# Patient Record
Sex: FEMALE | Race: WHITE | Employment: OTHER | URBAN - METROPOLITAN AREA
[De-identification: names, ages, dates, MRNs, and addresses within clinical notes are randomized per-mention and may not be internally consistent; named-entity substitution may affect disease eponyms.]

---

## 2017-01-14 ENCOUNTER — HOSPITAL ENCOUNTER (EMERGENCY)
Facility: HOSPITAL | Age: 47
Discharge: HOME | End: 2017-01-14
Payer: MEDICARE

## 2017-01-14 ENCOUNTER — APPOINTMENT (OUTPATIENT)
Dept: GENERAL RADIOLOGY | Facility: HOSPITAL | Age: 47
End: 2017-01-14
Payer: MEDICARE

## 2017-01-14 VITALS
HEIGHT: 63 IN | WEIGHT: 274 LBS | TEMPERATURE: 87 F | BODY MASS INDEX: 48.55 KG/M2 | OXYGEN SATURATION: 98 % | HEART RATE: 79 BPM | SYSTOLIC BLOOD PRESSURE: 129 MMHG | DIASTOLIC BLOOD PRESSURE: 70 MMHG | RESPIRATION RATE: 18 BRPM

## 2017-01-14 DIAGNOSIS — S62.645A CLOSED NONDISPLACED FRACTURE OF PROXIMAL PHALANX OF LEFT RING FINGER, INITIAL ENCOUNTER: Primary | ICD-10-CM

## 2017-01-14 PROCEDURE — 73130 X-RAY EXAM OF HAND: CPT

## 2017-01-14 PROCEDURE — 99283 EMERGENCY DEPT VISIT LOW MDM: CPT

## 2017-01-14 RX ORDER — TRAMADOL HYDROCHLORIDE 50 MG/1
50 TABLET ORAL EVERY 8 HOURS PRN
Qty: 15 TABLET | Refills: 0 | Status: SHIPPED | OUTPATIENT
Start: 2017-01-14 | End: 2017-01-19

## 2017-01-14 RX ORDER — TRAMADOL HYDROCHLORIDE 50 MG/1
50 TABLET ORAL ONCE
Status: COMPLETED | OUTPATIENT
Start: 2017-01-14 | End: 2017-01-14

## 2017-01-15 NOTE — ED PROVIDER NOTES
Patient Seen in: [facility] AND [facility] Emergency Department    History   Patient presents with:  Musculoskeletal Problem    Stated Complaint: Lt hand injury     HPI    43 yo F with PMH HTN, CREST syndrome on chronic prednisone presenting for evaluation of left MCG Oral Tab,  Take 1,000 mcg by mouth daily. azathioprine 100 MG Oral Tab,  Take 150 mg by mouth daily.    Albuterol Sulfate HFA (PROAIR HFA) 108 (90 BASE) MCG/ACT Inhalation Aero Soln,  Inhale 2 puffs into the lungs every 4 (four) hours as needed for [employer] Device 01/14/17 1902 None (Room air)       Current:/78 mmHg  Pulse 89  Temp(Src) 98 °F (36.7 °C) (Oral)  Resp 18  Ht 160 cm (5' 3\")  Wt 124.286 kg  BMI 48.55 kg/m2  SpO2 98%  LMP 11/08/2011        Physical Exam   Constitutional: No distress. Obese. history and physical exam differential diagnosis was considered for fracture, dislocation, sprain. Pulse ox: 98%:Normal on RA, as interpreted by myself    Evaluation for left fourth finger pain after [address] to left hand.  No proximal extremity tenderness,

## 2017-01-20 PROBLEM — S62.651A CLOSED NONDISPLACED FRACTURE OF MIDDLE PHALANX OF LEFT INDEX FINGER, INITIAL ENCOUNTER: Status: ACTIVE | Noted: 2017-01-20

## 2017-01-26 PROBLEM — S62.651D CLOSED NONDISPLACED FRACTURE OF MIDDLE PHALANX OF LEFT INDEX FINGER WITH ROUTINE HEALING, SUBSEQUENT ENCOUNTER: Status: ACTIVE | Noted: 2017-01-26

## 2017-01-27 ENCOUNTER — HOSPITAL ENCOUNTER (OUTPATIENT)
Facility: HOSPITAL | Age: 47
Setting detail: OBSERVATION
Discharge: HOME | End: 2017-01-28
Attending: EMERGENCY MEDICINE | Admitting: INTERNAL MEDICINE
Payer: MEDICARE

## 2017-01-27 DIAGNOSIS — R07.9 ACUTE CHEST PAIN: Primary | ICD-10-CM

## 2017-01-27 LAB
ANION GAP SERPL CALC-SCNC: 11 MMOL/L (ref 0–18)
BASOPHILS # BLD: 0.1 K/UL (ref 0–0.2)
BASOPHILS NFR BLD: 1 %
BUN SERPL-MCNC: 13 MG/DL (ref 8–20)
BUN/CREAT SERPL: 16 (ref 10–20)
CALCIUM SERPL-MCNC: 9.6 MG/DL (ref 8.5–10.5)
CHLORIDE SERPL-SCNC: 105 MMOL/L (ref 95–110)
CO2 SERPL-SCNC: 25 MMOL/L (ref 22–32)
CREAT SERPL-MCNC: 0.81 MG/DL (ref 0.5–1.5)
D DIMER PPP FEU-MCNC: 0.47 MCG/ML
EOSINOPHIL # BLD: 0.1 K/UL (ref 0–0.7)
EOSINOPHIL NFR BLD: 1 %
ERYTHROCYTE [DISTWIDTH] IN BLOOD BY AUTOMATED COUNT: 15.5 % (ref 11–15)
GLUCOSE SERPL-MCNC: 99 MG/DL (ref 70–99)
HCT VFR BLD AUTO: 41.2 % (ref 35–48)
HGB BLD-MCNC: 13.8 G/DL (ref 12–16)
LYMPHOCYTES # BLD: 1.4 K/UL (ref 1–4)
LYMPHOCYTES NFR BLD: 11 %
MCH RBC QN AUTO: 27.3 PG (ref 27–32)
MCHC RBC AUTO-ENTMCNC: 33.6 G/DL (ref 32–37)
MCV RBC AUTO: 81 FL (ref 80–100)
MONOCYTES # BLD: 0.6 K/UL (ref 0–1)
MONOCYTES NFR BLD: 5 %
NEUTROPHILS # BLD AUTO: 10.3 K/UL (ref 1.8–7.7)
NEUTROPHILS NFR BLD: 82 %
OSMOLALITY UR CALC.SUM OF ELEC: 292 MOSM/KG (ref 275–295)
PLATELET # BLD AUTO: 396 K/UL (ref 140–400)
PMV BLD AUTO: 6.8 FL (ref 7.4–10.3)
POTASSIUM SERPL-SCNC: 3.8 MMOL/L (ref 3.3–5.1)
RBC # BLD AUTO: 5.08 M/UL (ref 3.7–5.4)
SODIUM SERPL-SCNC: 141 MMOL/L (ref 136–144)
TROPONIN I SERPL-MCNC: 0 NG/ML (ref ?–0.03)
TROPONIN I SERPL-MCNC: 0 NG/ML (ref ?–0.03)
WBC # BLD AUTO: 12.5 K/UL (ref 4–11)

## 2017-01-27 PROCEDURE — 99285 EMERGENCY DEPT VISIT HI MDM: CPT

## 2017-01-27 PROCEDURE — 84484 ASSAY OF TROPONIN QUANT: CPT | Performed by: EMERGENCY MEDICINE

## 2017-01-27 PROCEDURE — 36415 COLL VENOUS BLD VENIPUNCTURE: CPT

## 2017-01-27 PROCEDURE — 80048 BASIC METABOLIC PNL TOTAL CA: CPT | Performed by: EMERGENCY MEDICINE

## 2017-01-27 PROCEDURE — 93010 ELECTROCARDIOGRAM REPORT: CPT | Performed by: EMERGENCY MEDICINE

## 2017-01-27 PROCEDURE — 93005 ELECTROCARDIOGRAM TRACING: CPT

## 2017-01-27 PROCEDURE — 85025 COMPLETE CBC W/AUTO DIFF WBC: CPT | Performed by: EMERGENCY MEDICINE

## 2017-01-27 PROCEDURE — 85379 FIBRIN DEGRADATION QUANT: CPT | Performed by: EMERGENCY MEDICINE

## 2017-01-27 RX ORDER — DOXEPIN HYDROCHLORIDE 50 MG/1
1 CAPSULE ORAL DAILY
Status: DISCONTINUED | OUTPATIENT
Start: 2017-01-27 | End: 2017-01-28

## 2017-01-27 RX ORDER — PREDNISONE 20 MG/1
20 TABLET ORAL DAILY
Status: DISCONTINUED | OUTPATIENT
Start: 2017-01-27 | End: 2017-01-28

## 2017-01-27 RX ORDER — MAGNESIUM OXIDE 400 MG (241.3 MG MAGNESIUM) TABLET
400 TABLET DAILY
Status: DISCONTINUED | OUTPATIENT
Start: 2017-01-27 | End: 2017-01-28

## 2017-01-27 RX ORDER — ALBUTEROL SULFATE 90 UG/1
2 AEROSOL, METERED RESPIRATORY (INHALATION) EVERY 4 HOURS PRN
Status: DISCONTINUED | OUTPATIENT
Start: 2017-01-27 | End: 2017-01-28

## 2017-01-27 RX ORDER — AZATHIOPRINE 50 MG/1
150 TABLET ORAL DAILY
Status: DISCONTINUED | OUTPATIENT
Start: 2017-01-27 | End: 2017-01-28

## 2017-01-27 RX ORDER — FLUTICASONE PROPIONATE 50 MCG
2 SPRAY, SUSPENSION (ML) NASAL 2 TIMES DAILY
Status: DISCONTINUED | OUTPATIENT
Start: 2017-01-27 | End: 2017-01-28

## 2017-01-27 RX ORDER — DULOXETIN HYDROCHLORIDE 30 MG/1
60 CAPSULE, DELAYED RELEASE ORAL DAILY
Status: DISCONTINUED | OUTPATIENT
Start: 2017-01-27 | End: 2017-01-28

## 2017-01-27 RX ORDER — TRAZODONE HYDROCHLORIDE 100 MG/1
100 TABLET ORAL NIGHTLY
Status: DISCONTINUED | OUTPATIENT
Start: 2017-01-27 | End: 2017-01-28

## 2017-01-27 RX ORDER — ALBUTEROL SULFATE 0.75 MG/3ML
0.63 SOLUTION RESPIRATORY (INHALATION) EVERY 4 HOURS PRN
Status: DISCONTINUED | OUTPATIENT
Start: 2017-01-27 | End: 2017-01-28

## 2017-01-27 RX ORDER — DOXEPIN HYDROCHLORIDE 50 MG/1
1 CAPSULE ORAL DAILY
COMMUNITY

## 2017-01-27 RX ORDER — MAGNESIUM OXIDE 400 MG (241.3 MG MAGNESIUM) TABLET
400 TABLET DAILY
COMMUNITY
End: 2017-02-03 | Stop reason: ALTCHOICE

## 2017-01-27 RX ORDER — ENOXAPARIN SODIUM 100 MG/ML
40 INJECTION SUBCUTANEOUS DAILY
Status: DISCONTINUED | OUTPATIENT
Start: 2017-01-28 | End: 2017-01-28

## 2017-01-27 RX ORDER — PANTOPRAZOLE SODIUM 40 MG/1
40 TABLET, DELAYED RELEASE ORAL
Status: DISCONTINUED | OUTPATIENT
Start: 2017-01-28 | End: 2017-01-28

## 2017-01-28 ENCOUNTER — APPOINTMENT (OUTPATIENT)
Dept: NUCLEAR MEDICINE | Facility: HOSPITAL | Age: 47
End: 2017-01-28
Attending: INTERNAL MEDICINE
Payer: MEDICARE

## 2017-01-28 ENCOUNTER — APPOINTMENT (OUTPATIENT)
Dept: CV DIAGNOSTICS | Facility: HOSPITAL | Age: 47
End: 2017-01-28
Attending: INTERNAL MEDICINE
Payer: MEDICARE

## 2017-01-28 VITALS
OXYGEN SATURATION: 96 % | SYSTOLIC BLOOD PRESSURE: 133 MMHG | HEART RATE: 81 BPM | BODY MASS INDEX: 49.02 KG/M2 | RESPIRATION RATE: 16 BRPM | DIASTOLIC BLOOD PRESSURE: 67 MMHG | TEMPERATURE: 98 F | WEIGHT: 276.63 LBS | HEIGHT: 63 IN

## 2017-01-28 LAB
ANION GAP SERPL CALC-SCNC: 8 MMOL/L (ref 0–18)
BASOPHILS # BLD: 0.1 K/UL (ref 0–0.2)
BASOPHILS NFR BLD: 1 %
BUN SERPL-MCNC: 14 MG/DL (ref 8–20)
BUN/CREAT SERPL: 15.1 (ref 10–20)
CALCIUM SERPL-MCNC: 9 MG/DL (ref 8.5–10.5)
CHLORIDE SERPL-SCNC: 106 MMOL/L (ref 95–110)
CO2 SERPL-SCNC: 27 MMOL/L (ref 22–32)
CREAT SERPL-MCNC: 0.93 MG/DL (ref 0.5–1.5)
EOSINOPHIL # BLD: 0.4 K/UL (ref 0–0.7)
EOSINOPHIL NFR BLD: 4 %
ERYTHROCYTE [DISTWIDTH] IN BLOOD BY AUTOMATED COUNT: 15.5 % (ref 11–15)
GLUCOSE SERPL-MCNC: 112 MG/DL (ref 70–99)
HCT VFR BLD AUTO: 38.1 % (ref 35–48)
HGB BLD-MCNC: 12.7 G/DL (ref 12–16)
LYMPHOCYTES # BLD: 2.1 K/UL (ref 1–4)
LYMPHOCYTES NFR BLD: 22 %
MAGNESIUM SERPL-MCNC: 1.9 MG/DL (ref 1.8–2.5)
MCH RBC QN AUTO: 27.1 PG (ref 27–32)
MCHC RBC AUTO-ENTMCNC: 33.3 G/DL (ref 32–37)
MCV RBC AUTO: 81.4 FL (ref 80–100)
MONOCYTES # BLD: 0.9 K/UL (ref 0–1)
MONOCYTES NFR BLD: 9 %
NEUTROPHILS # BLD AUTO: 6.4 K/UL (ref 1.8–7.7)
NEUTROPHILS NFR BLD: 65 %
OSMOLALITY UR CALC.SUM OF ELEC: 293 MOSM/KG (ref 275–295)
PLATELET # BLD AUTO: 341 K/UL (ref 140–400)
PMV BLD AUTO: 6.6 FL (ref 7.4–10.3)
POTASSIUM SERPL-SCNC: 3.6 MMOL/L (ref 3.3–5.1)
RBC # BLD AUTO: 4.68 M/UL (ref 3.7–5.4)
SODIUM SERPL-SCNC: 141 MMOL/L (ref 136–144)
TROPONIN I SERPL-MCNC: 0 NG/ML (ref ?–0.03)
WBC # BLD AUTO: 9.8 K/UL (ref 4–11)

## 2017-01-28 PROCEDURE — 94660 CPAP INITIATION&MGMT: CPT

## 2017-01-28 PROCEDURE — 93017 CV STRESS TEST TRACING ONLY: CPT

## 2017-01-28 PROCEDURE — 93016 CV STRESS TEST SUPVJ ONLY: CPT | Performed by: INTERNAL MEDICINE

## 2017-01-28 PROCEDURE — 84484 ASSAY OF TROPONIN QUANT: CPT | Performed by: HOSPITALIST

## 2017-01-28 PROCEDURE — 78452 HT MUSCLE IMAGE SPECT MULT: CPT

## 2017-01-28 PROCEDURE — 85025 COMPLETE CBC W/AUTO DIFF WBC: CPT | Performed by: INTERNAL MEDICINE

## 2017-01-28 PROCEDURE — 83735 ASSAY OF MAGNESIUM: CPT | Performed by: INTERNAL MEDICINE

## 2017-01-28 PROCEDURE — 80048 BASIC METABOLIC PNL TOTAL CA: CPT | Performed by: INTERNAL MEDICINE

## 2017-01-28 PROCEDURE — 96372 THER/PROPH/DIAG INJ SC/IM: CPT

## 2017-01-28 PROCEDURE — 94640 AIRWAY INHALATION TREATMENT: CPT

## 2017-01-28 PROCEDURE — 93018 CV STRESS TEST I&R ONLY: CPT | Performed by: INTERNAL MEDICINE

## 2017-01-28 RX ORDER — SODIUM CHLORIDE 0.9 % (FLUSH) 0.9 %
SYRINGE (ML) INJECTION
Status: COMPLETED
Start: 2017-01-28 | End: 2017-01-28

## 2017-01-28 NOTE — ED PROVIDER NOTES
Patient Seen in: [facility] AND [facility] Emergency Department    History   Patient presents with:  Chest Pain Angina (cardiovascular)    Stated Complaint: chest pain    HPI    42year old female presenting with chest pain  Location: right chest area.   Quality: a Budesonide-Formoterol Fumarate (SYMBICORT) 160-4.5 MCG/ACT Inhalation Aerosol,  Inhale 2 puffs into the lungs 2 (two) times daily. COD LIVER OIL OR,  Take by mouth. MILK THISTLE OR,  Take by mouth.    Biotin 1000 MCG Oral Tab,  Take 1,000 mcg by mout agreed except as otherwise stated in HPI.     Physical Exam     ED Triage Vitals   BP 01/27/17 1840 149/94 mmHg   Pulse 01/27/17 1840 98   Resp 01/27/17 1840 20   Temp 01/27/17 1840 97.7 °F (36.5 °C)   Temp src 01/27/17 1840 Temporal   SpO2 01/27/17 1840 98 12.5 (*)     RDW 15.5 (*)     MPV 6.8 (*)     Neutrophil Absolute 10.3 (*)     All other components within normal limits   TROPONIN I, 0 HOUR - Normal   BASIC METABOLIC PANEL (8) - Normal   CBC WITH DIFFERENTIAL WITH PLATELET    Narrative:      The followin ST deviation 2pt, Non spec 1pt, nl 0pt)  1   AGE- (>65 2pt, 45-64 1 pt, Under 45 0 pt)   0   Risk Factors- ( DM,HTN,Chol, fhx CAD, BMI>30, hx CAD)  ( >3 or hx CAD 2pt, 1-2 risks 1pt, None 0pt)  1   Troponin- ( 3 times nl 2pt, 2 times nl 1pt, nl 0pt   0 problem list. to contribute to the complexity of this ED evaluation. Radiology Interpretation: Radiology reports and images reviewed personally and are negative.  I discussed these results, their acute management issues, and the need for follow-up, with

## 2017-01-28 NOTE — ED NOTES
Patient to ED for c/o of sudden onset chest pain that began while she was sitting in a car. While walking around at the grocery store the pain intensified in her mid sternal chest area with radiation to the right shoulder and upper back.  She states that pa

## 2017-01-28 NOTE — H&P
[facility] Internal Medicine Discharge Summary   Patient ID:  Didier Lamas  [id number]  55 year old  4/19/1970    Admit date: 1/27/2017    Discharge date and time: 1/28/17    Attending Physician: Piero Sandra MD     Primary Care Physician: Lily Hernandez predniSONE 10 MG Tabs   Commonly known as:  Robert Mini 160-4.5 MCG/ACT Aero   Generic drug:  Budesonide-Formoterol Fumarate       TraZODone HCl 100 MG Tabs   Commonly known as:  DESYREL   Take 1 tablet by mouth nightly.        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

## 2017-01-28 NOTE — H&P
[facility] Hospitalist H&P     CC: Patient presents with:  Chest Pain Angina (cardiovascular)       PCP: Lily Hernandez MD      Assessment and Plan     Didier Lamas is a 42year old female with PMH sig for Churg elizabeth syndrome, eosinophilic gastroenteritis allergic rhinitis who  Presented with sudden onset of moderate chest pressure like pain in the right chest, radiating to to her right arm/shoulder. Lasted about 1 hour and then resolved. Was sitting. Thought it got worse with walking.   Denies N/V/diarph Use: No        Fam Hx  Family History   Problem Relation Age of Onset   • Other Father      \"dark patches on skin\"   • Other Sister      psoriatic arthritis, \"adult-onset hydrocephalus\"   • Other Brother      psoriatic arthritis, vitiligo           Obj tissue swelling. Dictated by (CST): Petey Watts MD on 1/14/2017 at 20:15     Approved by (CST): Petey Watts MD on 1/14/2017 at 20:19          1/14/2017  CONCLUSION:  1. Nondisplaced fracture proximal phalanx left ring finger.

## 2017-01-28 NOTE — ED INITIAL ASSESSMENT (HPI)
Pt c/o sudden onset of chest pain back pain and right shoulder pain since 515pm EKG completed in triage diarrhea and nausea x 3 days

## 2017-01-28 NOTE — ED NOTES
Report given to [facility]. Tele box ordered. Will request transport once in place.

## 2017-01-28 NOTE — PLAN OF CARE
Problem: PAIN - ADULT  Goal: Verbalizes/displays adequate comfort level or patient’s stated pain goal  INTERVENTIONS:  - Encourage pt to monitor pain and request assistance  - Assess pain using appropriate pain scale  - Administer analgesics based on type needed  - Consider post-discharge preferences of patient/family/discharge partner  - Complete POLST form as appropriate  - Assess patient’s ability to be responsible for managing their own health  - Refer to Case Management Department for coordinating disc

## 2017-01-29 NOTE — DISCHARGE SUMMARY
[facility] Internal Medicine Discharge Summary   Patient ID:  Jordan Sherman  [id number]  54 year old  4/19/1970    Admit date: 1/27/2017    Discharge date and time: 1/28/17    Attending Physician: Romelia Erwin MD     Primary Care Physician: Zane Kehr, predniSONE 10 MG Tabs   Commonly known as:  Ardyce Blind 160-4.5 MCG/ACT Aero   Generic drug:  Budesonide-Formoterol Fumarate       TraZODone HCl 100 MG Tabs   Commonly known as:  DESYREL   Take 1 tablet by mouth nightly.        WATER PILL/POTA 42year old female with PMH sig for Churg elizabeth syndrome, eosinophilic gastroenteritis, recurrent pelurisy/pericarditis on chronic steroids, vasculitic neuropathy, anxiety, GERD, depression, HTN,  Asthma, and allergic rhinitis who  Presented with sudden

## 2017-02-03 RX ORDER — METOCLOPRAMIDE 10 MG/1
10 TABLET ORAL ONCE
Status: CANCELLED | OUTPATIENT
Start: 2017-02-03 | End: 2017-02-03

## 2017-02-03 NOTE — OR NURSING
Pt states, \"I gave this information already and don't have time\" when called to review medical history and give instructions for surgery.  I was able to review medications pt is to take the morning of surgery and instruct pt to hold her herbals and supple

## 2017-02-08 ENCOUNTER — ANESTHESIA (OUTPATIENT)
Dept: SURGERY | Facility: HOSPITAL | Age: 47
End: 2017-02-08
Payer: MEDICARE

## 2017-02-08 ENCOUNTER — HOSPITAL ENCOUNTER (OUTPATIENT)
Facility: HOSPITAL | Age: 47
Setting detail: HOSPITAL OUTPATIENT SURGERY
Discharge: HOME | End: 2017-02-08
Attending: ORTHOPAEDIC SURGERY | Admitting: ORTHOPAEDIC SURGERY
Payer: MEDICARE

## 2017-02-08 ENCOUNTER — SURGERY (OUTPATIENT)
Age: 47
End: 2017-02-08

## 2017-02-08 ENCOUNTER — ANESTHESIA EVENT (OUTPATIENT)
Dept: SURGERY | Facility: HOSPITAL | Age: 47
End: 2017-02-08
Payer: MEDICARE

## 2017-02-08 VITALS
HEART RATE: 83 BPM | RESPIRATION RATE: 14 BRPM | SYSTOLIC BLOOD PRESSURE: 124 MMHG | OXYGEN SATURATION: 96 % | HEIGHT: 63 IN | TEMPERATURE: 98 F | WEIGHT: 281 LBS | DIASTOLIC BLOOD PRESSURE: 49 MMHG | BODY MASS INDEX: 49.79 KG/M2

## 2017-02-08 DIAGNOSIS — S62.615A: ICD-10-CM

## 2017-02-08 PROCEDURE — 0PSV34Z REPOSITION LEFT FINGER PHALANX WITH INTERNAL FIXATION DEVICE, PERCUTANEOUS APPROACH: ICD-10-PCS | Performed by: ORTHOPAEDIC SURGERY

## 2017-02-08 RX ORDER — SODIUM CHLORIDE, SODIUM LACTATE, POTASSIUM CHLORIDE, CALCIUM CHLORIDE 600; 310; 30; 20 MG/100ML; MG/100ML; MG/100ML; MG/100ML
INJECTION, SOLUTION INTRAVENOUS CONTINUOUS PRN
Status: DISCONTINUED | OUTPATIENT
Start: 2017-02-08 | End: 2017-02-08 | Stop reason: SURG

## 2017-02-08 RX ORDER — SODIUM CHLORIDE, SODIUM LACTATE, POTASSIUM CHLORIDE, CALCIUM CHLORIDE 600; 310; 30; 20 MG/100ML; MG/100ML; MG/100ML; MG/100ML
INJECTION, SOLUTION INTRAVENOUS CONTINUOUS
Status: DISCONTINUED | OUTPATIENT
Start: 2017-02-08 | End: 2017-02-08

## 2017-02-08 RX ORDER — HALOPERIDOL 5 MG/ML
0.25 INJECTION INTRAMUSCULAR ONCE AS NEEDED
Status: DISCONTINUED | OUTPATIENT
Start: 2017-02-08 | End: 2017-02-08

## 2017-02-08 RX ORDER — MORPHINE SULFATE 10 MG/ML
6 INJECTION, SOLUTION INTRAMUSCULAR; INTRAVENOUS EVERY 10 MIN PRN
Status: DISCONTINUED | OUTPATIENT
Start: 2017-02-08 | End: 2017-02-08

## 2017-02-08 RX ORDER — HYDROMORPHONE HYDROCHLORIDE 1 MG/ML
0.4 INJECTION, SOLUTION INTRAMUSCULAR; INTRAVENOUS; SUBCUTANEOUS EVERY 5 MIN PRN
Status: DISCONTINUED | OUTPATIENT
Start: 2017-02-08 | End: 2017-02-08

## 2017-02-08 RX ORDER — MORPHINE SULFATE 4 MG/ML
4 INJECTION, SOLUTION INTRAMUSCULAR; INTRAVENOUS EVERY 10 MIN PRN
Status: DISCONTINUED | OUTPATIENT
Start: 2017-02-08 | End: 2017-02-08

## 2017-02-08 RX ORDER — NALOXONE HYDROCHLORIDE 0.4 MG/ML
80 INJECTION, SOLUTION INTRAMUSCULAR; INTRAVENOUS; SUBCUTANEOUS AS NEEDED
Status: DISCONTINUED | OUTPATIENT
Start: 2017-02-08 | End: 2017-02-08

## 2017-02-08 RX ORDER — HYDROMORPHONE HYDROCHLORIDE 1 MG/ML
0.2 INJECTION, SOLUTION INTRAMUSCULAR; INTRAVENOUS; SUBCUTANEOUS EVERY 5 MIN PRN
Status: DISCONTINUED | OUTPATIENT
Start: 2017-02-08 | End: 2017-02-08

## 2017-02-08 RX ORDER — ACETAMINOPHEN 325 MG/1
650 TABLET ORAL ONCE
Status: COMPLETED | OUTPATIENT
Start: 2017-02-08 | End: 2017-02-08

## 2017-02-08 RX ORDER — ONDANSETRON 2 MG/ML
4 INJECTION INTRAMUSCULAR; INTRAVENOUS ONCE AS NEEDED
Status: DISCONTINUED | OUTPATIENT
Start: 2017-02-08 | End: 2017-02-08

## 2017-02-08 RX ORDER — FAMOTIDINE 20 MG/1
20 TABLET ORAL ONCE
Status: COMPLETED | OUTPATIENT
Start: 2017-02-08 | End: 2017-02-08

## 2017-02-08 RX ORDER — MIDAZOLAM HYDROCHLORIDE 1 MG/ML
INJECTION INTRAMUSCULAR; INTRAVENOUS AS NEEDED
Status: DISCONTINUED | OUTPATIENT
Start: 2017-02-08 | End: 2017-02-08 | Stop reason: SURG

## 2017-02-08 RX ORDER — HYDROCODONE BITARTRATE AND ACETAMINOPHEN 5; 325 MG/1; MG/1
2 TABLET ORAL AS NEEDED
Status: DISCONTINUED | OUTPATIENT
Start: 2017-02-08 | End: 2017-02-08

## 2017-02-08 RX ORDER — HYDROMORPHONE HYDROCHLORIDE 1 MG/ML
0.6 INJECTION, SOLUTION INTRAMUSCULAR; INTRAVENOUS; SUBCUTANEOUS EVERY 5 MIN PRN
Status: DISCONTINUED | OUTPATIENT
Start: 2017-02-08 | End: 2017-02-08

## 2017-02-08 RX ORDER — MORPHINE SULFATE 2 MG/ML
2 INJECTION, SOLUTION INTRAMUSCULAR; INTRAVENOUS EVERY 10 MIN PRN
Status: DISCONTINUED | OUTPATIENT
Start: 2017-02-08 | End: 2017-02-08

## 2017-02-08 RX ORDER — HYDROCODONE BITARTRATE AND ACETAMINOPHEN 5; 325 MG/1; MG/1
1 TABLET ORAL AS NEEDED
Status: DISCONTINUED | OUTPATIENT
Start: 2017-02-08 | End: 2017-02-08

## 2017-02-08 RX ADMIN — SODIUM CHLORIDE, SODIUM LACTATE, POTASSIUM CHLORIDE, CALCIUM CHLORIDE: 600; 310; 30; 20 INJECTION, SOLUTION INTRAVENOUS at 12:35:00

## 2017-02-08 RX ADMIN — MIDAZOLAM HYDROCHLORIDE 2 MG: 1 INJECTION INTRAMUSCULAR; INTRAVENOUS at 12:36:00

## 2017-02-08 RX ADMIN — MIDAZOLAM HYDROCHLORIDE 2 MG: 1 INJECTION INTRAMUSCULAR; INTRAVENOUS at 12:50:00

## 2017-02-08 NOTE — ANESTHESIA POSTPROCEDURE EVALUATION
Patient: Amarilis Madison    Procedure Summary     Date Anesthesia Start Anesthesia Stop Room / Location    02/08/17 7849 [id number] [facility] OR 09 / [facility] OR       Procedure Diagnosis Surgeon Responsible Provider    FINGER PERCUTANEOUS PINNING (Left Finger)

## 2017-02-08 NOTE — H&P (VIEW-ONLY)
Florentino Burden  1/19/2017     CHIEF COMPLAINT     Patient presents with:  Hand Injury: left hand injury. fell 1/14/17. [facility] x-ray 1/14/17. some swelling. right hand dominant. icing.          HISTORY OF PRESENT ILLNESS     Florentino Burden is a 42 year Oral Chew Tab  Disp:  Rfl:    azathioprine 50 MG Oral Tab  Disp:  Rfl: 2   predniSONE 10 MG Oral Tab  Disp:  Rfl: 2   Budesonide-Formoterol Fumarate (SYMBICORT) 160-4.5 MCG/ACT Inhalation Aerosol Inhale 2 puffs into the lungs 2 (two) times daily.  Disp:  Rf History:   Social History    Marital Status: Single              Spouse Name:                       Years of Education:                 Number of children:               Social History Main Topics    Smoking Status: Never Smoker                      Smokel was intact in both forearms and hands. Subjective sensation was normal.    Motor Exam:  There was no extrinsic or intrinsic muscular atrophy. Active motion of the fingers was intact but limited by pain.      IMAGING     2 views of the left hand, today dem

## 2017-02-08 NOTE — INTERVAL H&P NOTE
Pre-op Diagnosis: left ring finger fracture    The above referenced H&P was reviewed by Zoila Meng MD on 2/8/2017, the patient was examined and no significant changes have occurred in the patient's condition since the H&P was performed.   I discussed with sidney

## 2017-02-08 NOTE — ANESTHESIA PREPROCEDURE EVALUATION
Anesthesia PreOp Note    HPI:     Emelia Hatchet is a 42year old female who presents for preoperative consultation requested by: Tana Aceves MD    Date of Surgery: 2/8/2017    Procedure(s):   FINGER PERCUTANEOUS PINNING  Indication: left ring finger frac Noted: 11/10/2009        Past Medical History   Diagnosis Date   • ALLERGIC RHINITIS    • ASTHMA    • DEPRESSION    • GERD      eosinophilic gastroenteritis   • HYPERTENSION    • OBESITY    • OSTEOARTHRITIS    • OSTEOPOROSIS    • OTHER DISEASES      Abhishek Disp:  Rfl:  2/8/2017 at 0400   Biotin 1000 MCG Oral Tab Take 1,000 mcg by mouth daily. Disp:  Rfl:  2/1/2017   azathioprine 100 MG Oral Tab Take 150 mg by mouth daily.  Disp:  Rfl:  2/5/2017   Albuterol Sulfate HFA (PROAIR HFA) 108 (90 BASE) MCG/ACT Inhala file.      Azithromycin                Comment:Other reaction(s): Vomiting  Zithromax               Diarrhea, Nausea and vomiting    Family History   Problem Relation Age of Onset   • [other] [OTHER] Father      \"dark patches on skin\"   • Diabetes Father pulse is 80. Her respiration is 20 and oxygen saturation is 98%. 02/03/17  1246 02/08/17  1112   BP:  156/91   Pulse:  80   Temp:  98.1 °F (36.7 °C)   TempSrc:  Oral   Resp:  20   Height: 1.6 m (5' 3\") 1.6 m (5' 3\")   Weight: 125.193 kg (276 lb) 127. 4

## 2017-02-09 NOTE — OPERATIVE REPORT
Operative Note    Patient: Gerhard Park MRN: [id number]     YOB: 1970  Age: 42year old  Sex: female    Unit: [address] [place] [address] PRE-OP RECOVERY Room/Bed: [address] [place] [address]/[facility] Location: [address] [place] [address]     Date of Procedure: 2/8/2017   Preoperative Diagnosis: discharged to home with a plan to follow-up within 2 weeks

## 2017-02-14 PROBLEM — Z47.89 ORTHOPEDIC AFTERCARE: Status: ACTIVE | Noted: 2017-02-14

## 2017-02-21 PROBLEM — M79.645 FINGER PAIN, LEFT: Status: ACTIVE | Noted: 2017-02-21

## 2017-03-09 PROCEDURE — 81003 URINALYSIS AUTO W/O SCOPE: CPT | Performed by: INTERNAL MEDICINE

## 2017-06-09 PROCEDURE — 81003 URINALYSIS AUTO W/O SCOPE: CPT | Performed by: INTERNAL MEDICINE

## 2017-06-09 PROCEDURE — 87086 URINE CULTURE/COLONY COUNT: CPT | Performed by: INTERNAL MEDICINE

## 2017-07-01 ENCOUNTER — HOSPITAL ENCOUNTER (EMERGENCY)
Facility: HOSPITAL | Age: 47
Discharge: HOME | End: 2017-07-01
Attending: EMERGENCY MEDICINE
Payer: MEDICARE

## 2017-07-01 ENCOUNTER — APPOINTMENT (OUTPATIENT)
Dept: CT IMAGING | Facility: HOSPITAL | Age: 47
End: 2017-07-01
Attending: EMERGENCY MEDICINE
Payer: MEDICARE

## 2017-07-01 VITALS
BODY MASS INDEX: 47.84 KG/M2 | SYSTOLIC BLOOD PRESSURE: 135 MMHG | TEMPERATURE: 98 F | OXYGEN SATURATION: 98 % | HEART RATE: 70 BPM | WEIGHT: 270 LBS | HEIGHT: 63 IN | RESPIRATION RATE: 16 BRPM | DIASTOLIC BLOOD PRESSURE: 70 MMHG

## 2017-07-01 DIAGNOSIS — R07.89 CHEST WALL PAIN: Primary | ICD-10-CM

## 2017-07-01 LAB
ANION GAP SERPL CALC-SCNC: 10 MMOL/L (ref 0–18)
BASOPHILS # BLD: 0.1 K/UL (ref 0–0.2)
BASOPHILS NFR BLD: 1 %
BUN SERPL-MCNC: 17 MG/DL (ref 8–20)
BUN/CREAT SERPL: 22.1 (ref 10–20)
CALCIUM SERPL-MCNC: 9.3 MG/DL (ref 8.5–10.5)
CHLORIDE SERPL-SCNC: 104 MMOL/L (ref 95–110)
CO2 SERPL-SCNC: 25 MMOL/L (ref 22–32)
CREAT SERPL-MCNC: 0.77 MG/DL (ref 0.5–1.5)
EOSINOPHIL # BLD: 0.1 K/UL (ref 0–0.7)
EOSINOPHIL NFR BLD: 1 %
ERYTHROCYTE [DISTWIDTH] IN BLOOD BY AUTOMATED COUNT: 15.4 % (ref 11–15)
GLUCOSE SERPL-MCNC: 95 MG/DL (ref 70–99)
HCT VFR BLD AUTO: 40.9 % (ref 35–48)
HGB BLD-MCNC: 13.6 G/DL (ref 12–16)
LYMPHOCYTES # BLD: 2.4 K/UL (ref 1–4)
LYMPHOCYTES NFR BLD: 17 %
MCH RBC QN AUTO: 26.8 PG (ref 27–32)
MCHC RBC AUTO-ENTMCNC: 33.2 G/DL (ref 32–37)
MCV RBC AUTO: 80.7 FL (ref 80–100)
MONOCYTES # BLD: 1 K/UL (ref 0–1)
MONOCYTES NFR BLD: 7 %
NEUTROPHILS # BLD AUTO: 10.6 K/UL (ref 1.8–7.7)
NEUTROPHILS NFR BLD: 75 %
OSMOLALITY UR CALC.SUM OF ELEC: 289 MOSM/KG (ref 275–295)
PLATELET # BLD AUTO: 343 K/UL (ref 140–400)
PMV BLD AUTO: 6.7 FL (ref 7.4–10.3)
POTASSIUM SERPL-SCNC: 3.9 MMOL/L (ref 3.3–5.1)
RBC # BLD AUTO: 5.07 M/UL (ref 3.7–5.4)
SODIUM SERPL-SCNC: 139 MMOL/L (ref 136–144)
TROPONIN I SERPL-MCNC: 0.01 NG/ML (ref ?–0.03)
WBC # BLD AUTO: 14.2 K/UL (ref 4–11)

## 2017-07-01 PROCEDURE — 93010 ELECTROCARDIOGRAM REPORT: CPT | Performed by: EMERGENCY MEDICINE

## 2017-07-01 PROCEDURE — 96374 THER/PROPH/DIAG INJ IV PUSH: CPT

## 2017-07-01 PROCEDURE — 96376 TX/PRO/DX INJ SAME DRUG ADON: CPT

## 2017-07-01 PROCEDURE — 80048 BASIC METABOLIC PNL TOTAL CA: CPT | Performed by: EMERGENCY MEDICINE

## 2017-07-01 PROCEDURE — 93005 ELECTROCARDIOGRAM TRACING: CPT

## 2017-07-01 PROCEDURE — 71260 CT THORAX DX C+: CPT | Performed by: EMERGENCY MEDICINE

## 2017-07-01 PROCEDURE — 85025 COMPLETE CBC W/AUTO DIFF WBC: CPT | Performed by: EMERGENCY MEDICINE

## 2017-07-01 PROCEDURE — 84484 ASSAY OF TROPONIN QUANT: CPT | Performed by: EMERGENCY MEDICINE

## 2017-07-01 PROCEDURE — 99285 EMERGENCY DEPT VISIT HI MDM: CPT

## 2017-07-01 RX ORDER — MORPHINE SULFATE 4 MG/ML
4 INJECTION, SOLUTION INTRAMUSCULAR; INTRAVENOUS ONCE
Status: COMPLETED | OUTPATIENT
Start: 2017-07-01 | End: 2017-07-01

## 2017-07-01 NOTE — ED INITIAL ASSESSMENT (HPI)
Left arm, breast and back pain that has been going on for 2 weeks.  Saw NP last Friday and chest x-ray was done and was negative

## 2017-07-06 PROCEDURE — 81001 URINALYSIS AUTO W/SCOPE: CPT | Performed by: INTERNAL MEDICINE

## 2017-07-07 PROCEDURE — 87186 SC STD MICRODIL/AGAR DIL: CPT | Performed by: INTERNAL MEDICINE

## 2017-07-07 PROCEDURE — 87088 URINE BACTERIA CULTURE: CPT | Performed by: INTERNAL MEDICINE

## 2017-07-07 PROCEDURE — 87086 URINE CULTURE/COLONY COUNT: CPT | Performed by: INTERNAL MEDICINE

## 2017-07-08 NOTE — ED PROVIDER NOTES
Patient Seen in: [facility] Emergency Department    History   Patient presents with:  Upper Extremity Injury (musculoskeletal)    Stated Complaint: left side,left arm, left back pain     HPI    Two weeks of pain in the left arm, breast and upper b TEASPOONFUL (5MLS) BY MOUTH 4 TIMES DAILY   IPRATROPIUM BROMIDE 0.06 % Nasal Solution,  2 SPRAYS INTO EACH NOSTRIL TWICE DAILY AS NEEDED FOR RHINITIS   Reslizumab 100 MG/10ML Intravenous Solution,  584 mg every 30 (thirty) days.      Albuterol Sulfate HFA ( from today and agreed except as otherwise stated in HPI.     Physical Exam   ED Triage Vitals [07/01/17 0043]  BP: 150/84  Pulse: 70  Resp: 16  Temp: 98.4 °F (36.9 °C)  Temp src: Temporal  SpO2: 97 %  O2 Device: None (Room air)    Current:/70   Pulse created for panel order CBC WITH DIFFERENTIAL WITH PLATELET.   Procedure                               Abnormality         Status                     ---------                               -----------         ------                     CBC W/ DIFFERENTIAL[

## 2017-08-17 PROBLEM — S62.651D CLOSED NONDISPLACED FRACTURE OF MIDDLE PHALANX OF LEFT INDEX FINGER WITH ROUTINE HEALING, SUBSEQUENT ENCOUNTER: Status: RESOLVED | Noted: 2017-01-26 | Resolved: 2017-08-17

## 2017-08-17 PROBLEM — S62.651A CLOSED NONDISPLACED FRACTURE OF MIDDLE PHALANX OF LEFT INDEX FINGER, INITIAL ENCOUNTER: Status: RESOLVED | Noted: 2017-01-20 | Resolved: 2017-08-17

## 2017-08-17 PROBLEM — J45.50: Status: ACTIVE | Noted: 2017-08-17

## 2017-08-17 PROBLEM — M79.645 FINGER PAIN, LEFT: Status: RESOLVED | Noted: 2017-02-21 | Resolved: 2017-08-17

## 2017-08-17 PROBLEM — Z47.89 ORTHOPEDIC AFTERCARE: Status: RESOLVED | Noted: 2017-02-14 | Resolved: 2017-08-17

## 2017-08-17 PROBLEM — R07.9 ACUTE CHEST PAIN: Status: RESOLVED | Noted: 2017-01-27 | Resolved: 2017-08-17

## 2017-09-16 ENCOUNTER — APPOINTMENT (OUTPATIENT)
Dept: GENERAL RADIOLOGY | Facility: HOSPITAL | Age: 47
End: 2017-09-16
Payer: MEDICARE

## 2017-09-16 ENCOUNTER — HOSPITAL ENCOUNTER (EMERGENCY)
Facility: HOSPITAL | Age: 47
Discharge: HOME | End: 2017-09-16
Attending: EMERGENCY MEDICINE
Payer: MEDICARE

## 2017-09-16 VITALS
SYSTOLIC BLOOD PRESSURE: 139 MMHG | BODY MASS INDEX: 48.73 KG/M2 | DIASTOLIC BLOOD PRESSURE: 69 MMHG | WEIGHT: 275 LBS | OXYGEN SATURATION: 100 % | TEMPERATURE: 99 F | HEART RATE: 92 BPM | HEIGHT: 63 IN | RESPIRATION RATE: 16 BRPM

## 2017-09-16 DIAGNOSIS — J45.901 ASTHMA EXACERBATION: Primary | ICD-10-CM

## 2017-09-16 LAB
ANION GAP SERPL CALC-SCNC: 8 MMOL/L (ref 0–18)
BASOPHILS # BLD: 0 K/UL (ref 0–0.2)
BASOPHILS NFR BLD: 0 %
BUN SERPL-MCNC: 14 MG/DL (ref 8–20)
BUN/CREAT SERPL: 17.9 (ref 10–20)
CALCIUM SERPL-MCNC: 9 MG/DL (ref 8.5–10.5)
CHLORIDE SERPL-SCNC: 106 MMOL/L (ref 95–110)
CO2 SERPL-SCNC: 22 MMOL/L (ref 22–32)
CREAT SERPL-MCNC: 0.78 MG/DL (ref 0.5–1.5)
D DIMER PPP FEU-MCNC: 0.44 MCG/ML (ref ?–0.5)
EOSINOPHIL # BLD: 0 K/UL (ref 0–0.7)
EOSINOPHIL NFR BLD: 0 %
ERYTHROCYTE [DISTWIDTH] IN BLOOD BY AUTOMATED COUNT: 15.3 % (ref 11–15)
GLUCOSE SERPL-MCNC: 141 MG/DL (ref 70–99)
HCT VFR BLD AUTO: 38.7 % (ref 35–48)
HGB BLD-MCNC: 13 G/DL (ref 12–16)
LYMPHOCYTES # BLD: 2.5 K/UL (ref 1–4)
LYMPHOCYTES NFR BLD: 17 %
MCH RBC QN AUTO: 27.5 PG (ref 27–32)
MCHC RBC AUTO-ENTMCNC: 33.7 G/DL (ref 32–37)
MCV RBC AUTO: 81.9 FL (ref 80–100)
MONOCYTES # BLD: 0.3 K/UL (ref 0–1)
MONOCYTES NFR BLD: 2 %
NEUTROPHILS # BLD AUTO: 12 K/UL (ref 1.8–7.7)
NEUTROPHILS NFR BLD: 80 %
NEUTS BAND NFR BLD: 1 %
OSMOLALITY UR CALC.SUM OF ELEC: 285 MOSM/KG (ref 275–295)
PLATELET # BLD AUTO: 346 K/UL (ref 140–400)
PMV BLD AUTO: 6.6 FL (ref 7.4–10.3)
POTASSIUM SERPL-SCNC: 3.6 MMOL/L (ref 3.3–5.1)
RBC # BLD AUTO: 4.73 M/UL (ref 3.7–5.4)
SODIUM SERPL-SCNC: 136 MMOL/L (ref 136–144)
WBC # BLD AUTO: 14.8 K/UL (ref 4–11)

## 2017-09-16 PROCEDURE — 85007 BL SMEAR W/DIFF WBC COUNT: CPT | Performed by: EMERGENCY MEDICINE

## 2017-09-16 PROCEDURE — 93010 ELECTROCARDIOGRAM REPORT: CPT | Performed by: EMERGENCY MEDICINE

## 2017-09-16 PROCEDURE — 71020 XR CHEST PA + LAT CHEST (CPT=71020): CPT | Performed by: EMERGENCY MEDICINE

## 2017-09-16 PROCEDURE — 85379 FIBRIN DEGRADATION QUANT: CPT | Performed by: EMERGENCY MEDICINE

## 2017-09-16 PROCEDURE — 85027 COMPLETE CBC AUTOMATED: CPT | Performed by: EMERGENCY MEDICINE

## 2017-09-16 PROCEDURE — 99285 EMERGENCY DEPT VISIT HI MDM: CPT

## 2017-09-16 PROCEDURE — 93005 ELECTROCARDIOGRAM TRACING: CPT

## 2017-09-16 PROCEDURE — 94640 AIRWAY INHALATION TREATMENT: CPT

## 2017-09-16 PROCEDURE — 85025 COMPLETE CBC W/AUTO DIFF WBC: CPT | Performed by: EMERGENCY MEDICINE

## 2017-09-16 PROCEDURE — 36415 COLL VENOUS BLD VENIPUNCTURE: CPT

## 2017-09-16 PROCEDURE — 80048 BASIC METABOLIC PNL TOTAL CA: CPT | Performed by: EMERGENCY MEDICINE

## 2017-09-16 RX ORDER — PREDNISONE 20 MG/1
40 TABLET ORAL ONCE
Status: COMPLETED | OUTPATIENT
Start: 2017-09-16 | End: 2017-09-16

## 2017-09-16 RX ORDER — IPRATROPIUM BROMIDE AND ALBUTEROL SULFATE 2.5; .5 MG/3ML; MG/3ML
3 SOLUTION RESPIRATORY (INHALATION) ONCE
Status: COMPLETED | OUTPATIENT
Start: 2017-09-16 | End: 2017-09-16

## 2017-09-16 NOTE — ED PROVIDER NOTES
Patient Seen in: [facility] AND [facility] Emergency Department    History   Patient presents with:  Dyspnea IVORY SOB (respiratory)    Stated Complaint: asthma, sob    HPI    53-year-old female with history of Churg-Salomon syndrome, eosinophilic asthma, eosinoph cataracts   • OTHER DISEASES     pleurisy/pericarditis 2006   • OTHER DISEASES     secondary Cushing's Disease   • Pleuritis    • Pneumonia due to organism    • Pulmonary emphysema ([facility] [address]    • Sleep apnea     NO MACHINE   • Vasculitic neuropathy (HCC) moist  Respiratory: there are no retractions, scattered wheezes bilaterally  Cardiovascular: regular rate and rhythm  Gastrointestinal:  abdomen is soft and non tender, no masses, bowel sounds normal  Neurological: Speech normal.  Moving extremities equall and lab results noted. The patient reports feeling better.   Will discharge home with plans to go back to 40 mg of prednisone daily until instructed otherwise by her rheumatologist and/or allergist.      Disposition and Plan     Clinical Impression:  Asthm

## 2017-10-13 PROCEDURE — 81003 URINALYSIS AUTO W/O SCOPE: CPT | Performed by: INTERNAL MEDICINE

## 2017-11-03 PROCEDURE — 87086 URINE CULTURE/COLONY COUNT: CPT | Performed by: INTERNAL MEDICINE

## 2017-12-14 PROCEDURE — 81001 URINALYSIS AUTO W/SCOPE: CPT | Performed by: INTERNAL MEDICINE

## 2017-12-28 PROCEDURE — 81001 URINALYSIS AUTO W/SCOPE: CPT | Performed by: INTERNAL MEDICINE

## 2018-02-03 PROCEDURE — 81003 URINALYSIS AUTO W/O SCOPE: CPT | Performed by: INTERNAL MEDICINE

## 2018-03-08 PROBLEM — IMO0001 CLASS 3 OBESITY DUE TO EXCESS CALORIES WITH SERIOUS COMORBIDITY AND BODY MASS INDEX (BMI) OF 40.0 TO 44.9 IN ADULT: Status: ACTIVE | Noted: 2018-03-08

## 2018-06-15 PROCEDURE — 81003 URINALYSIS AUTO W/O SCOPE: CPT | Performed by: INTERNAL MEDICINE

## 2018-07-09 PROCEDURE — 81001 URINALYSIS AUTO W/SCOPE: CPT | Performed by: INTERNAL MEDICINE

## 2018-08-22 PROCEDURE — 87427 SHIGA-LIKE TOXIN AG IA: CPT | Performed by: EMERGENCY MEDICINE

## 2018-08-22 PROCEDURE — 87046 STOOL CULTR AEROBIC BACT EA: CPT | Performed by: EMERGENCY MEDICINE

## 2018-08-22 PROCEDURE — 87045 FECES CULTURE AEROBIC BACT: CPT | Performed by: EMERGENCY MEDICINE

## 2018-12-14 ENCOUNTER — HOSPITAL ENCOUNTER (OUTPATIENT)
Facility: HOSPITAL | Age: 48
Setting detail: OBSERVATION
Discharge: HOME | End: 2018-12-15
Attending: EMERGENCY MEDICINE
Payer: MEDICARE

## 2018-12-14 ENCOUNTER — APPOINTMENT (OUTPATIENT)
Dept: GENERAL RADIOLOGY | Facility: HOSPITAL | Age: 48
End: 2018-12-14
Attending: EMERGENCY MEDICINE
Payer: MEDICARE

## 2018-12-14 DIAGNOSIS — R07.9 CHEST PAIN OF UNCERTAIN ETIOLOGY: Primary | ICD-10-CM

## 2018-12-14 DIAGNOSIS — R06.02 SHORTNESS OF BREATH: ICD-10-CM

## 2018-12-14 LAB
ANION GAP SERPL CALC-SCNC: 12 MMOL/L (ref 0–18)
BASOPHILS # BLD: 0 K/UL (ref 0–0.2)
BASOPHILS NFR BLD: 0 %
BUN SERPL-MCNC: 12 MG/DL (ref 8–20)
BUN/CREAT SERPL: 11.8 (ref 10–20)
CALCIUM SERPL-MCNC: 9.7 MG/DL (ref 8.5–10.5)
CHLORIDE SERPL-SCNC: 97 MMOL/L (ref 95–110)
CO2 SERPL-SCNC: 24 MMOL/L (ref 22–32)
CREAT SERPL-MCNC: 1.02 MG/DL (ref 0.5–1.5)
EOSINOPHIL # BLD: 0 K/UL (ref 0–0.7)
EOSINOPHIL NFR BLD: 0 %
ERYTHROCYTE [DISTWIDTH] IN BLOOD BY AUTOMATED COUNT: 14.6 % (ref 11–15)
GLUCOSE SERPL-MCNC: 93 MG/DL (ref 70–99)
HCT VFR BLD AUTO: 44 % (ref 35–48)
HGB BLD-MCNC: 14.6 G/DL (ref 12–16)
LYMPHOCYTES # BLD: 1.2 K/UL (ref 1–4)
LYMPHOCYTES NFR BLD: 11 %
MCH RBC QN AUTO: 27.3 PG (ref 27–32)
MCHC RBC AUTO-ENTMCNC: 33.3 G/DL (ref 32–37)
MCV RBC AUTO: 82 FL (ref 80–100)
MONOCYTES # BLD: 1 K/UL (ref 0–1)
MONOCYTES NFR BLD: 9 %
NEUTROPHILS # BLD AUTO: 9.3 K/UL (ref 1.8–7.7)
NEUTROPHILS NFR BLD: 80 %
OSMOLALITY UR CALC.SUM OF ELEC: 275 MOSM/KG (ref 275–295)
PLATELET # BLD AUTO: 460 K/UL (ref 140–400)
PMV BLD AUTO: 6.9 FL (ref 7.4–10.3)
POTASSIUM SERPL-SCNC: 3.5 MMOL/L (ref 3.3–5.1)
RBC # BLD AUTO: 5.37 M/UL (ref 3.7–5.4)
SODIUM SERPL-SCNC: 133 MMOL/L (ref 136–144)
TROPONIN I SERPL-MCNC: 0.03 NG/ML (ref ?–0.03)
WBC # BLD AUTO: 11.5 K/UL (ref 4–11)

## 2018-12-14 PROCEDURE — 80048 BASIC METABOLIC PNL TOTAL CA: CPT | Performed by: EMERGENCY MEDICINE

## 2018-12-14 PROCEDURE — 93010 ELECTROCARDIOGRAM REPORT: CPT | Performed by: EMERGENCY MEDICINE

## 2018-12-14 PROCEDURE — 93005 ELECTROCARDIOGRAM TRACING: CPT

## 2018-12-14 PROCEDURE — 84484 ASSAY OF TROPONIN QUANT: CPT | Performed by: EMERGENCY MEDICINE

## 2018-12-14 PROCEDURE — 85025 COMPLETE CBC W/AUTO DIFF WBC: CPT | Performed by: EMERGENCY MEDICINE

## 2018-12-14 PROCEDURE — 71045 X-RAY EXAM CHEST 1 VIEW: CPT | Performed by: EMERGENCY MEDICINE

## 2018-12-14 PROCEDURE — 96374 THER/PROPH/DIAG INJ IV PUSH: CPT

## 2018-12-14 PROCEDURE — 99285 EMERGENCY DEPT VISIT HI MDM: CPT

## 2018-12-15 ENCOUNTER — APPOINTMENT (OUTPATIENT)
Dept: CV DIAGNOSTICS | Facility: HOSPITAL | Age: 48
End: 2018-12-15
Attending: INTERNAL MEDICINE
Payer: MEDICARE

## 2018-12-15 ENCOUNTER — APPOINTMENT (OUTPATIENT)
Dept: ULTRASOUND IMAGING | Facility: HOSPITAL | Age: 48
End: 2018-12-15
Attending: HOSPITALIST
Payer: MEDICARE

## 2018-12-15 ENCOUNTER — APPOINTMENT (OUTPATIENT)
Dept: CT IMAGING | Facility: HOSPITAL | Age: 48
End: 2018-12-15
Attending: HOSPITALIST
Payer: MEDICARE

## 2018-12-15 ENCOUNTER — APPOINTMENT (OUTPATIENT)
Dept: NUCLEAR MEDICINE | Facility: HOSPITAL | Age: 48
End: 2018-12-15
Attending: INTERNAL MEDICINE
Payer: MEDICARE

## 2018-12-15 VITALS
RESPIRATION RATE: 18 BRPM | DIASTOLIC BLOOD PRESSURE: 76 MMHG | HEART RATE: 82 BPM | BODY MASS INDEX: 47.07 KG/M2 | TEMPERATURE: 98 F | HEIGHT: 63 IN | OXYGEN SATURATION: 97 % | WEIGHT: 265.69 LBS | SYSTOLIC BLOOD PRESSURE: 139 MMHG

## 2018-12-15 PROBLEM — R06.02 SHORTNESS OF BREATH: Status: ACTIVE | Noted: 2018-12-15

## 2018-12-15 PROBLEM — R07.9 CHEST PAIN OF UNCERTAIN ETIOLOGY: Status: ACTIVE | Noted: 2018-12-15

## 2018-12-15 LAB
ADENOVIRUS PCR:: NEGATIVE
B PERT DNA SPEC QL NAA+PROBE: NEGATIVE
C PNEUM DNA SPEC QL NAA+PROBE: NEGATIVE
CHOLEST SERPL-MCNC: 157 MG/DL (ref 110–200)
CORONAVIRUS 229E PCR:: NEGATIVE
CORONAVIRUS HKU1 PCR:: NEGATIVE
CORONAVIRUS NL63 PCR:: NEGATIVE
CORONAVIRUS OC43 PCR:: NEGATIVE
D DIMER PPP FEU-MCNC: 2.14 MCG/ML (ref ?–0.5)
FLUAV RNA SPEC QL NAA+PROBE: NEGATIVE
FLUBV RNA SPEC QL NAA+PROBE: NEGATIVE
HDLC SERPL-MCNC: 32 MG/DL
LDLC SERPL CALC-MCNC: 106 MG/DL (ref 0–99)
METAPNEUMOVIRUS PCR:: NEGATIVE
MYCOPLASMA PNEUMONIA PCR:: NEGATIVE
NONHDLC SERPL-MCNC: 125 MG/DL
PARAINFLUENZA 1 PCR:: NEGATIVE
PARAINFLUENZA 2 PCR:: NEGATIVE
PARAINFLUENZA 3 PCR:: NEGATIVE
PARAINFLUENZA 4 PCR:: NEGATIVE
POTASSIUM SERPL-SCNC: 3.7 MMOL/L (ref 3.3–5.1)
RHINOVIRUS/ENTERO PCR:: POSITIVE
RSV RNA SPEC QL NAA+PROBE: NEGATIVE
TRIGL SERPL-MCNC: 93 MG/DL (ref 1–149)
TROPONIN I SERPL-MCNC: 0.01 NG/ML (ref ?–0.03)
TROPONIN I SERPL-MCNC: 0.01 NG/ML (ref ?–0.03)

## 2018-12-15 PROCEDURE — 84132 ASSAY OF SERUM POTASSIUM: CPT | Performed by: INTERNAL MEDICINE

## 2018-12-15 PROCEDURE — 87798 DETECT AGENT NOS DNA AMP: CPT | Performed by: HOSPITALIST

## 2018-12-15 PROCEDURE — 87486 CHLMYD PNEUM DNA AMP PROBE: CPT | Performed by: HOSPITALIST

## 2018-12-15 PROCEDURE — 93016 CV STRESS TEST SUPVJ ONLY: CPT | Performed by: INTERNAL MEDICINE

## 2018-12-15 PROCEDURE — 93970 EXTREMITY STUDY: CPT | Performed by: HOSPITALIST

## 2018-12-15 PROCEDURE — 71275 CT ANGIOGRAPHY CHEST: CPT | Performed by: HOSPITALIST

## 2018-12-15 PROCEDURE — 85379 FIBRIN DEGRADATION QUANT: CPT | Performed by: HOSPITALIST

## 2018-12-15 PROCEDURE — 96372 THER/PROPH/DIAG INJ SC/IM: CPT

## 2018-12-15 PROCEDURE — 93010 ELECTROCARDIOGRAM REPORT: CPT | Performed by: EMERGENCY MEDICINE

## 2018-12-15 PROCEDURE — 93005 ELECTROCARDIOGRAM TRACING: CPT

## 2018-12-15 PROCEDURE — A4216 STERILE WATER/SALINE, 10 ML: HCPCS

## 2018-12-15 PROCEDURE — 93018 CV STRESS TEST I&R ONLY: CPT | Performed by: INTERNAL MEDICINE

## 2018-12-15 PROCEDURE — 84484 ASSAY OF TROPONIN QUANT: CPT | Performed by: EMERGENCY MEDICINE

## 2018-12-15 PROCEDURE — 80061 LIPID PANEL: CPT | Performed by: INTERNAL MEDICINE

## 2018-12-15 PROCEDURE — 84484 ASSAY OF TROPONIN QUANT: CPT | Performed by: HOSPITALIST

## 2018-12-15 PROCEDURE — 87581 M.PNEUMON DNA AMP PROBE: CPT | Performed by: HOSPITALIST

## 2018-12-15 PROCEDURE — 87633 RESP VIRUS 12-25 TARGETS: CPT | Performed by: HOSPITALIST

## 2018-12-15 RX ORDER — MYCOPHENOLATE MOFETIL 500 MG/1
500 TABLET ORAL 2 TIMES DAILY
COMMUNITY
End: 2019-02-04 | Stop reason: ALTCHOICE

## 2018-12-15 RX ORDER — NITROGLYCERIN 0.4 MG/1
0.4 TABLET SUBLINGUAL EVERY 5 MIN PRN
Status: DISCONTINUED | OUTPATIENT
Start: 2018-12-15 | End: 2018-12-15

## 2018-12-15 RX ORDER — KETOROLAC TROMETHAMINE 30 MG/ML
30 INJECTION, SOLUTION INTRAMUSCULAR; INTRAVENOUS ONCE
Status: COMPLETED | OUTPATIENT
Start: 2018-12-15 | End: 2018-12-15

## 2018-12-15 RX ORDER — SODIUM CHLORIDE 9 MG/ML
1000 INJECTION, SOLUTION INTRAVENOUS ONCE
Status: COMPLETED | OUTPATIENT
Start: 2018-12-15 | End: 2018-12-15

## 2018-12-15 RX ORDER — PANTOPRAZOLE SODIUM 40 MG/1
40 TABLET, DELAYED RELEASE ORAL
Status: DISCONTINUED | OUTPATIENT
Start: 2018-12-15 | End: 2018-12-15

## 2018-12-15 RX ORDER — 0.9 % SODIUM CHLORIDE 0.9 %
VIAL (ML) INJECTION
Status: DISCONTINUED
Start: 2018-12-15 | End: 2018-12-15

## 2018-12-15 RX ORDER — DULOXETIN HYDROCHLORIDE 30 MG/1
60 CAPSULE, DELAYED RELEASE ORAL DAILY
Status: DISCONTINUED | OUTPATIENT
Start: 2018-12-15 | End: 2018-12-15

## 2018-12-15 RX ORDER — ALBUTEROL SULFATE 90 UG/1
1 AEROSOL, METERED RESPIRATORY (INHALATION) EVERY 4 HOURS PRN
Status: DISCONTINUED | OUTPATIENT
Start: 2018-12-15 | End: 2018-12-15

## 2018-12-15 RX ORDER — ONDANSETRON 2 MG/ML
4 INJECTION INTRAMUSCULAR; INTRAVENOUS EVERY 4 HOURS PRN
Status: DISCONTINUED | OUTPATIENT
Start: 2018-12-15 | End: 2018-12-15

## 2018-12-15 RX ORDER — ENOXAPARIN SODIUM 100 MG/ML
40 INJECTION SUBCUTANEOUS DAILY
Status: DISCONTINUED | OUTPATIENT
Start: 2018-12-15 | End: 2018-12-15

## 2018-12-15 RX ORDER — POTASSIUM CHLORIDE 20 MEQ/1
40 TABLET, EXTENDED RELEASE ORAL EVERY 4 HOURS
Status: COMPLETED | OUTPATIENT
Start: 2018-12-15 | End: 2018-12-15

## 2018-12-15 RX ORDER — ASPIRIN 325 MG
325 TABLET ORAL DAILY
Status: DISCONTINUED | OUTPATIENT
Start: 2018-12-15 | End: 2018-12-15

## 2018-12-15 RX ORDER — MYCOPHENOLATE MOFETIL 250 MG/1
500 CAPSULE ORAL 2 TIMES DAILY
Status: DISCONTINUED | OUTPATIENT
Start: 2018-12-15 | End: 2018-12-15

## 2018-12-15 RX ORDER — NITROGLYCERIN 0.4 MG/1
0.4 TABLET SUBLINGUAL ONCE
Status: COMPLETED | OUTPATIENT
Start: 2018-12-15 | End: 2018-12-15

## 2018-12-15 RX ORDER — LISINOPRIL 20 MG/1
20 TABLET ORAL DAILY
Status: DISCONTINUED | OUTPATIENT
Start: 2018-12-15 | End: 2018-12-15

## 2018-12-15 NOTE — CONSULTS
[facility]    PATIENT'S NAME: Jad Jake NOVA   ATTENDING PHYSICIAN: Arben Zaragoza MD   CONSULTING PHYSICIAN: Kenzie Mensah.  Chantelle Christiansen MD   PATIENT ACCOUNT#:   [id number]    LOCATION:  [address] [place] [address] [address] #:   [id number]       DATE OF BIRTH:  6 years.    FAMILY HISTORY:  Includes a father with congestive heart failure and a younger brother who has had a MI.    REVIEW OF SYSTEMS:  Review of systems was obtained and otherwise negative.   She has no history of thyroid disease, but did have some thyro

## 2018-12-15 NOTE — H&P
[facility] Hospitalist H&P       CC: Patient presents with:  Chest Pain Angina (cardiovascular)       PCP: Neel Cho MD    History of Present Illness:   Nile Lara is a 57year old female with PMH sig for Churg elizabeth syndrome, eosinophilic gastroent left broken finger, right ankle, left elbow   • OTHER SURGICAL HISTORY      fractures of L arm 2002, pin removal 2003   • OTHER SURGICAL HISTORY      pleural biopsy        ALL:    Azithromycin                Comment:Other reaction(s): Vomiting  Zithromax Fumarate (SYMBICORT) 160-4.5 MCG/ACT Inhalation Aerosol Inhale 2 puffs into the lungs 2 (two) times daily. Disp:  Rfl:          Soc Hx  Social History    Tobacco Use      Smoking status: Never Smoker      Smokeless tobacco: Never Used    Alcohol use:  No 4. 30  3.5  3.7   CL  98*  97   --    CO2  19.9*  24   --    BUN  12  12   --    CREATSERUM  1.16*  1.02   --    GLU  103*  93   --    CA  10.4*  9.7   --        No results for input(s): ALT, AST, ALB, AMYLASE, LIPASE, LDH in the last 168 hours.     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

## 2018-12-15 NOTE — ED INITIAL ASSESSMENT (HPI)
Pt to ED with c/o chest pain with diaphoresis and SOB with several other symptoms. States pain comes and goes.

## 2018-12-15 NOTE — ED NOTES
Patient is resting in bed, call light at reach. Per patient is continue to having pain under left breast that radiates to back intermittently. MD notified. 30 mg IV Toradol per MD. Will continue to monitor.

## 2018-12-15 NOTE — ED PROVIDER NOTES
Patient Seen in: [facility] AND [facility] Emergency Department    History   Patient presents with:  Chest Pain Angina (cardiovascular)      HPI    Patient presents to the ED complaining of anterior chest pain, shortness of breath and diaphoresis.   Symptoms star HISTORY      fractures of L arm 2002, pin removal 2003   • OTHER SURGICAL HISTORY      pleural biopsy         Medications :    (Not in a hospital admission)     Family History   Problem Relation Age of Onset   • Other ([other]) Father         Janinekateryna Palumbo lincoln that reproduces symptoms. Abdominal: Soft. She exhibits no distension. Musculoskeletal: She exhibits no edema or deformity. Neurological: She is alert and oriented to person, place, and time. Skin: Skin is warm and dry.    Psychiatric: She has a nor Monitor Interpretation:   normal sinus rhythm    Differential Diagnosis/ Diagnostic Considerations: Atypical chest pain, ACS, angina    Medical Record Review: I personally reviewed available prior medical records for any recent pertinent discharge summ

## 2018-12-15 NOTE — PROCEDURES
[facility]  Cardioversion Note    [facility]   [facility] [id number] MRN [id number]   Admission Date 12/14/2018 Procedure Date 12/15/2018   Attending Physician Marce Rocha MD Procedure Physician Freda Aguayo MD     Pre-Ope

## 2018-12-15 NOTE — PLAN OF CARE
Problem: Patient Centered Care  Goal: Patient preferences are identified and integrated in the patient's plan of care  Interventions:  - What would you like us to know as we care for you?  I like a good sense of humor  - Provide timely, complete, and accura behavior  - Position to facilitate oxygenation and minimize respiratory effort  - Oxygen supplementation based on oxygen saturation or ABGs  - Provide Smoking Cessation handout, if applicable  - Encourage broncho-pulmonary hygiene including cough, deep billy Complaining of chest pain, MD notified and PRN pain medication given.

## 2018-12-15 NOTE — DISCHARGE SUMMARY
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   Her EKG, serial William Bosch MD on 12/15/2018 at 7:55     Approved by (CST): William Bosch MD on 12/15/2018 at 7:55          Cta Chest For Pulmonary Embolism (cpt=71275)(cs)    Result Date: 12/15/2018  CONCLUSION:   No pulmonary embolus in the central, main or lobar pulmon Nebulizer/Tubing/Mouthpiece Kit  Take 1 kit by nebulization 4 (four) times daily as needed.      omega-3 fatty acids 1000 MG Caps  Commonly known as:  FISH OIL     predniSONE 10 MG Tabs  Commonly known as:  DELTASONE     Reslizumab 100 MG/10ML Soln     Ri

## 2019-04-07 ENCOUNTER — HOSPITAL ENCOUNTER (EMERGENCY)
Facility: HOSPITAL | Age: 49
Discharge: HOME | End: 2019-04-08
Attending: EMERGENCY MEDICINE
Payer: MEDICARE

## 2019-04-07 DIAGNOSIS — R11.2 NAUSEA AND VOMITING IN ADULT: ICD-10-CM

## 2019-04-07 DIAGNOSIS — R10.13 EPIGASTRIC PAIN: Primary | ICD-10-CM

## 2019-04-07 PROCEDURE — 83690 ASSAY OF LIPASE: CPT | Performed by: EMERGENCY MEDICINE

## 2019-04-07 PROCEDURE — 85025 COMPLETE CBC W/AUTO DIFF WBC: CPT | Performed by: EMERGENCY MEDICINE

## 2019-04-07 PROCEDURE — 80076 HEPATIC FUNCTION PANEL: CPT | Performed by: EMERGENCY MEDICINE

## 2019-04-07 PROCEDURE — 96361 HYDRATE IV INFUSION ADD-ON: CPT

## 2019-04-07 PROCEDURE — 80048 BASIC METABOLIC PNL TOTAL CA: CPT | Performed by: EMERGENCY MEDICINE

## 2019-04-07 PROCEDURE — 96374 THER/PROPH/DIAG INJ IV PUSH: CPT

## 2019-04-07 PROCEDURE — 99284 EMERGENCY DEPT VISIT MOD MDM: CPT

## 2019-04-07 PROCEDURE — 93010 ELECTROCARDIOGRAM REPORT: CPT | Performed by: EMERGENCY MEDICINE

## 2019-04-07 PROCEDURE — 93005 ELECTROCARDIOGRAM TRACING: CPT

## 2019-04-07 RX ORDER — ONDANSETRON 2 MG/ML
4 INJECTION INTRAMUSCULAR; INTRAVENOUS ONCE
Status: COMPLETED | OUTPATIENT
Start: 2019-04-07 | End: 2019-04-07

## 2019-04-07 RX ORDER — MAGNESIUM HYDROXIDE/ALUMINUM HYDROXICE/SIMETHICONE 120; 1200; 1200 MG/30ML; MG/30ML; MG/30ML
30 SUSPENSION ORAL ONCE
Status: COMPLETED | OUTPATIENT
Start: 2019-04-07 | End: 2019-04-08

## 2019-04-08 VITALS
HEIGHT: 63 IN | WEIGHT: 275 LBS | TEMPERATURE: 98 F | HEART RATE: 81 BPM | DIASTOLIC BLOOD PRESSURE: 87 MMHG | SYSTOLIC BLOOD PRESSURE: 132 MMHG | OXYGEN SATURATION: 95 % | BODY MASS INDEX: 48.73 KG/M2 | RESPIRATION RATE: 18 BRPM

## 2019-04-08 RX ORDER — ACETAMINOPHEN 500 MG
1000 TABLET ORAL ONCE
Status: COMPLETED | OUTPATIENT
Start: 2019-04-08 | End: 2019-04-08

## 2019-04-08 RX ORDER — ONDANSETRON 4 MG/1
4 TABLET, ORALLY DISINTEGRATING ORAL EVERY 4 HOURS PRN
Qty: 15 TABLET | Refills: 0 | Status: SHIPPED | OUTPATIENT
Start: 2019-04-08 | End: 2019-04-18

## 2019-04-08 NOTE — ED INITIAL ASSESSMENT (HPI)
Pt chief complaint vomiting with epigastric pain for the last several days, also c/o chest pain, states she has pain at site of hiatal hernia as well.

## 2019-04-08 NOTE — ED PROVIDER NOTES
Patient Seen in: [facility] Emergency Department    History   Patient presents with:  Vomiting      HPI    Patient presents to the ED complaining of epigastric bowel pain for the past 2 days.   Pain comes and goes, no radiation and associated nause patches on skin\"   • Diabetes Father    • Heart Disorder Father    • Other ([other]) Sister         psoriatic arthritis, \"adult-onset hydrocephalus\"   • Diabetes Sister    • Other ([other]) Brother         psoriatic arthritis, vitiligo   • Diabetes Brot Skin: Skin is warm and dry. Psychiatric: She has a normal mood and affect. Her behavior is normal.   Nursing note and vitals reviewed.       ED Course        Labs Reviewed   HEPATIC FUNCTION PANEL (7) - Abnormal; Notable for the following components: 130/58 132/87   Pulse: 94  81 81   Resp: 18  16 18   Temp: 98.1 °F (36.7 °C)      TempSrc: Oral      SpO2: 98%  95% 95%   Weight: 124.7 kg      Height: 160 cm (5' 3\")        *I personally reviewed and interpreted all ED vitals.     Pulse Ox: 95%, Room air, Print Script, Disp-15 tablet, [employer]

## 2019-06-10 PROCEDURE — 87086 URINE CULTURE/COLONY COUNT: CPT | Performed by: PHYSICIAN ASSISTANT

## 2019-07-03 PROCEDURE — 81001 URINALYSIS AUTO W/SCOPE: CPT | Performed by: INTERNAL MEDICINE

## 2019-07-05 PROCEDURE — 87086 URINE CULTURE/COLONY COUNT: CPT | Performed by: INTERNAL MEDICINE

## 2019-08-08 PROCEDURE — 81003 URINALYSIS AUTO W/O SCOPE: CPT | Performed by: INTERNAL MEDICINE

## 2019-09-14 ENCOUNTER — HOSPITAL ENCOUNTER (EMERGENCY)
Facility: HOSPITAL | Age: 49
Discharge: HOME | End: 2019-09-14
Attending: EMERGENCY MEDICINE
Payer: MEDICARE

## 2019-09-14 ENCOUNTER — APPOINTMENT (OUTPATIENT)
Dept: GENERAL RADIOLOGY | Facility: HOSPITAL | Age: 49
End: 2019-09-14
Attending: EMERGENCY MEDICINE
Payer: MEDICARE

## 2019-09-14 VITALS
DIASTOLIC BLOOD PRESSURE: 73 MMHG | HEART RATE: 73 BPM | BODY MASS INDEX: 46.07 KG/M2 | TEMPERATURE: 98 F | RESPIRATION RATE: 14 BRPM | HEIGHT: 63 IN | OXYGEN SATURATION: 99 % | SYSTOLIC BLOOD PRESSURE: 130 MMHG | WEIGHT: 260 LBS

## 2019-09-14 DIAGNOSIS — M79.671 RIGHT FOOT PAIN: Primary | ICD-10-CM

## 2019-09-14 PROCEDURE — 73630 X-RAY EXAM OF FOOT: CPT | Performed by: EMERGENCY MEDICINE

## 2019-09-14 PROCEDURE — 99283 EMERGENCY DEPT VISIT LOW MDM: CPT

## 2019-09-14 PROCEDURE — 73610 X-RAY EXAM OF ANKLE: CPT | Performed by: EMERGENCY MEDICINE

## 2019-09-15 NOTE — ED PROVIDER NOTES
Patient Seen in: [facility] AND [facility] Emergency Department    History   Patient presents with:   Foot Pain    Stated Complaint: foot drop    HPI    Patient is a 44-year-old female who presents to the emergency department the chief complaint of right foot tim Other systems are as noted in HPI. Constitutional and vital signs reviewed. All other systems reviewed and negative except as noted above.     Physical Exam     ED Triage Vitals [09/14/19 1657]   /79   Pulse 90   Resp 20   Temp 98 °F (36.7 °C)

## 2019-09-18 PROCEDURE — 81001 URINALYSIS AUTO W/SCOPE: CPT | Performed by: INTERNAL MEDICINE

## 2019-10-08 PROBLEM — M72.2 PLANTAR FASCIITIS, RIGHT: Status: ACTIVE | Noted: 2019-10-08

## 2020-01-03 PROBLEM — E66.01 OBESITY, MORBID, BMI 40.0-49.9 (HCC): Status: ACTIVE | Noted: 2020-01-03

## 2020-01-03 PROBLEM — M35.9: Status: ACTIVE | Noted: 2020-01-03

## 2020-01-03 PROBLEM — G63: Status: ACTIVE | Noted: 2020-01-03

## 2020-01-03 PROBLEM — I70.0 ATHEROSCLEROSIS OF AORTA (HCC): Status: ACTIVE | Noted: 2020-01-03

## 2020-01-03 PROBLEM — D84.9 IMMUNOSUPPRESSED STATUS (HCC): Status: ACTIVE | Noted: 2020-01-03

## 2020-01-17 PROBLEM — E66.1 CLASS 3 DRUG-INDUCED OBESITY WITH SERIOUS COMORBIDITY AND BODY MASS INDEX (BMI) OF 40.0 TO 44.9 IN ADULT (HCC): Status: ACTIVE | Noted: 2018-03-08

## 2020-02-28 ENCOUNTER — HOSPITAL ENCOUNTER (INPATIENT)
Facility: HOSPITAL | Age: 50
LOS: 3 days | Discharge: HOME | DRG: 202 | End: 2020-03-02
Admitting: HOSPITALIST
Payer: MEDICARE

## 2020-02-28 ENCOUNTER — APPOINTMENT (OUTPATIENT)
Dept: GENERAL RADIOLOGY | Facility: HOSPITAL | Age: 50
DRG: 202 | End: 2020-02-28
Payer: MEDICARE

## 2020-02-28 DIAGNOSIS — B34.8 RHINOVIRUS INFECTION: ICD-10-CM

## 2020-02-28 DIAGNOSIS — J45.41 MODERATE PERSISTENT ASTHMA WITH EXACERBATION: Primary | ICD-10-CM

## 2020-02-28 LAB
ADENOVIRUS PCR:: NEGATIVE
ANION GAP SERPL CALC-SCNC: 5 MMOL/L (ref 0–18)
B PERT DNA SPEC QL NAA+PROBE: NEGATIVE
BASOPHILS # BLD AUTO: 0.03 X10(3) UL (ref 0–0.2)
BASOPHILS NFR BLD AUTO: 0.2 %
BUN BLD-MCNC: 15 MG/DL (ref 7–18)
BUN/CREAT SERPL: 17.2 (ref 10–20)
C PNEUM DNA SPEC QL NAA+PROBE: NEGATIVE
CALCIUM BLD-MCNC: 9 MG/DL (ref 8.5–10.1)
CHLORIDE SERPL-SCNC: 106 MMOL/L (ref 98–112)
CO2 SERPL-SCNC: 28 MMOL/L (ref 21–32)
CORONAVIRUS 229E PCR:: NEGATIVE
CORONAVIRUS HKU1 PCR:: NEGATIVE
CORONAVIRUS NL63 PCR:: NEGATIVE
CORONAVIRUS OC43 PCR:: NEGATIVE
CREAT BLD-MCNC: 0.87 MG/DL (ref 0.55–1.02)
DEPRECATED RDW RBC AUTO: 41.8 FL (ref 35.1–46.3)
EOSINOPHIL # BLD AUTO: 0 X10(3) UL (ref 0–0.7)
EOSINOPHIL NFR BLD AUTO: 0 %
ERYTHROCYTE [DISTWIDTH] IN BLOOD BY AUTOMATED COUNT: 13.3 % (ref 11–15)
FLUAV RNA SPEC QL NAA+PROBE: NEGATIVE
FLUBV RNA SPEC QL NAA+PROBE: NEGATIVE
GLUCOSE BLD-MCNC: 155 MG/DL (ref 70–99)
HCT VFR BLD AUTO: 38.1 % (ref 35–48)
HGB BLD-MCNC: 12.3 G/DL (ref 12–16)
IMM GRANULOCYTES # BLD AUTO: 0.28 X10(3) UL (ref 0–1)
IMM GRANULOCYTES NFR BLD: 1.8 %
LYMPHOCYTES # BLD AUTO: 1.67 X10(3) UL (ref 1–4)
LYMPHOCYTES NFR BLD AUTO: 10.7 %
MCH RBC QN AUTO: 27.8 PG (ref 26–34)
MCHC RBC AUTO-ENTMCNC: 32.3 G/DL (ref 31–37)
MCV RBC AUTO: 86.2 FL (ref 80–100)
METAPNEUMOVIRUS PCR:: NEGATIVE
MONOCYTES # BLD AUTO: 1.09 X10(3) UL (ref 0.1–1)
MONOCYTES NFR BLD AUTO: 7 %
MYCOPLASMA PNEUMONIA PCR:: NEGATIVE
NEUTROPHILS # BLD AUTO: 12.57 X10 (3) UL (ref 1.5–7.7)
NEUTROPHILS # BLD AUTO: 12.57 X10(3) UL (ref 1.5–7.7)
NEUTROPHILS NFR BLD AUTO: 80.3 %
OSMOLALITY SERPL CALC.SUM OF ELEC: 292 MOSM/KG (ref 275–295)
PARAINFLUENZA 1 PCR:: NEGATIVE
PARAINFLUENZA 2 PCR:: NEGATIVE
PARAINFLUENZA 3 PCR:: NEGATIVE
PARAINFLUENZA 4 PCR:: NEGATIVE
PLATELET # BLD AUTO: 417 10(3)UL (ref 150–450)
POTASSIUM SERPL-SCNC: 3.4 MMOL/L (ref 3.5–5.1)
RBC # BLD AUTO: 4.42 X10(6)UL (ref 3.8–5.3)
RHINOVIRUS/ENTERO PCR:: POSITIVE
RSV RNA SPEC QL NAA+PROBE: NEGATIVE
SODIUM SERPL-SCNC: 139 MMOL/L (ref 136–145)
WBC # BLD AUTO: 15.6 X10(3) UL (ref 4–11)

## 2020-02-28 PROCEDURE — 71046 X-RAY EXAM CHEST 2 VIEWS: CPT

## 2020-02-28 PROCEDURE — 87581 M.PNEUMON DNA AMP PROBE: CPT | Performed by: EMERGENCY MEDICINE

## 2020-02-28 PROCEDURE — 87798 DETECT AGENT NOS DNA AMP: CPT | Performed by: EMERGENCY MEDICINE

## 2020-02-28 PROCEDURE — 85025 COMPLETE CBC W/AUTO DIFF WBC: CPT | Performed by: EMERGENCY MEDICINE

## 2020-02-28 PROCEDURE — 94640 AIRWAY INHALATION TREATMENT: CPT

## 2020-02-28 PROCEDURE — 80048 BASIC METABOLIC PNL TOTAL CA: CPT | Performed by: EMERGENCY MEDICINE

## 2020-02-28 PROCEDURE — 94645 CONT INHLJ TX EACH ADDL HOUR: CPT

## 2020-02-28 PROCEDURE — 99291 CRITICAL CARE FIRST HOUR: CPT

## 2020-02-28 PROCEDURE — 96374 THER/PROPH/DIAG INJ IV PUSH: CPT

## 2020-02-28 PROCEDURE — 93005 ELECTROCARDIOGRAM TRACING: CPT

## 2020-02-28 PROCEDURE — 94644 CONT INHLJ TX 1ST HOUR: CPT

## 2020-02-28 PROCEDURE — 87633 RESP VIRUS 12-25 TARGETS: CPT | Performed by: EMERGENCY MEDICINE

## 2020-02-28 PROCEDURE — 93010 ELECTROCARDIOGRAM REPORT: CPT | Performed by: INTERNAL MEDICINE

## 2020-02-28 PROCEDURE — 87486 CHLMYD PNEUM DNA AMP PROBE: CPT | Performed by: EMERGENCY MEDICINE

## 2020-02-28 RX ORDER — ENOXAPARIN SODIUM 100 MG/ML
40 INJECTION SUBCUTANEOUS EVERY 24 HOURS
Status: DISCONTINUED | OUTPATIENT
Start: 2020-02-28 | End: 2020-03-01

## 2020-02-28 RX ORDER — TRAZODONE HYDROCHLORIDE 100 MG/1
100 TABLET ORAL NIGHTLY
Status: DISCONTINUED | OUTPATIENT
Start: 2020-02-28 | End: 2020-03-02

## 2020-02-28 RX ORDER — SODIUM CHLORIDE 0.9 % (FLUSH) 0.9 %
3 SYRINGE (ML) INJECTION AS NEEDED
Status: DISCONTINUED | OUTPATIENT
Start: 2020-02-28 | End: 2020-03-02

## 2020-02-28 RX ORDER — IPRATROPIUM BROMIDE AND ALBUTEROL SULFATE 2.5; .5 MG/3ML; MG/3ML
3 SOLUTION RESPIRATORY (INHALATION)
Status: DISCONTINUED | OUTPATIENT
Start: 2020-02-28 | End: 2020-03-02

## 2020-02-28 RX ORDER — LISINOPRIL 20 MG/1
20 TABLET ORAL DAILY
Status: DISCONTINUED | OUTPATIENT
Start: 2020-02-29 | End: 2020-02-28

## 2020-02-28 RX ORDER — PANTOPRAZOLE SODIUM 40 MG/1
40 TABLET, DELAYED RELEASE ORAL
Status: DISCONTINUED | OUTPATIENT
Start: 2020-02-29 | End: 2020-03-02

## 2020-02-28 RX ORDER — LISINOPRIL 20 MG/1
20 TABLET ORAL NIGHTLY
Status: DISCONTINUED | OUTPATIENT
Start: 2020-02-28 | End: 2020-03-02

## 2020-02-28 RX ORDER — DULOXETIN HYDROCHLORIDE 30 MG/1
30 CAPSULE, DELAYED RELEASE ORAL DAILY
Status: DISCONTINUED | OUTPATIENT
Start: 2020-02-29 | End: 2020-03-02

## 2020-02-28 RX ORDER — IPRATROPIUM BROMIDE 42 UG/1
2 SPRAY, METERED NASAL 2 TIMES DAILY PRN
Status: DISCONTINUED | OUTPATIENT
Start: 2020-02-28 | End: 2020-03-02

## 2020-02-28 RX ORDER — DOXEPIN HYDROCHLORIDE 50 MG/1
1 CAPSULE ORAL DAILY
Status: DISCONTINUED | OUTPATIENT
Start: 2020-02-28 | End: 2020-03-02

## 2020-02-28 RX ORDER — METHYLPREDNISOLONE SODIUM SUCCINATE 125 MG/2ML
125 INJECTION, POWDER, LYOPHILIZED, FOR SOLUTION INTRAMUSCULAR; INTRAVENOUS EVERY 8 HOURS
Status: DISCONTINUED | OUTPATIENT
Start: 2020-02-29 | End: 2020-02-29

## 2020-02-28 RX ORDER — METHYLPREDNISOLONE SODIUM SUCCINATE 125 MG/2ML
125 INJECTION, POWDER, LYOPHILIZED, FOR SOLUTION INTRAMUSCULAR; INTRAVENOUS ONCE
Status: COMPLETED | OUTPATIENT
Start: 2020-02-28 | End: 2020-02-28

## 2020-02-28 RX ORDER — TIZANIDINE 4 MG/1
4 TABLET ORAL NIGHTLY
Status: DISCONTINUED | OUTPATIENT
Start: 2020-02-28 | End: 2020-03-02

## 2020-02-28 RX ORDER — IPRATROPIUM BROMIDE AND ALBUTEROL SULFATE 2.5; .5 MG/3ML; MG/3ML
3 SOLUTION RESPIRATORY (INHALATION) ONCE
Status: COMPLETED | OUTPATIENT
Start: 2020-02-28 | End: 2020-02-28

## 2020-02-28 RX ORDER — ALBUTEROL SULFATE 2.5 MG/3ML
10 SOLUTION RESPIRATORY (INHALATION) CONTINUOUS
Status: DISCONTINUED | OUTPATIENT
Start: 2020-02-28 | End: 2020-02-28

## 2020-02-28 RX ORDER — FLUTICASONE PROPIONATE 50 MCG
2 SPRAY, SUSPENSION (ML) NASAL DAILY
Status: DISCONTINUED | OUTPATIENT
Start: 2020-02-28 | End: 2020-03-02

## 2020-02-28 RX ORDER — ALBUTEROL SULFATE 2.5 MG/3ML
10 SOLUTION RESPIRATORY (INHALATION) CONTINUOUS
Status: DISCONTINUED | OUTPATIENT
Start: 2020-02-28 | End: 2020-03-01 | Stop reason: ALTCHOICE

## 2020-02-28 RX ORDER — METHYLPREDNISOLONE SODIUM SUCCINATE 125 MG/2ML
125 INJECTION, POWDER, LYOPHILIZED, FOR SOLUTION INTRAMUSCULAR; INTRAVENOUS EVERY 8 HOURS
Status: DISCONTINUED | OUTPATIENT
Start: 2020-02-29 | End: 2020-02-28

## 2020-02-28 RX ORDER — ACETAMINOPHEN 325 MG/1
650 TABLET ORAL EVERY 6 HOURS PRN
Status: DISCONTINUED | OUTPATIENT
Start: 2020-02-28 | End: 2020-03-02

## 2020-02-28 NOTE — ED PROVIDER NOTES
Patient Seen in: [facility] Emergency Department      History   Patient presents with:  Dyspnea IVORY SOB    Stated Complaint: SOB    HPI    54year old female with multiple medical problems including mild intermittent asthma, hypertension, high cho pleural biopsy                    Social History    Tobacco Use      Smoking status: Never Smoker      Smokeless tobacco: Never Used    Alcohol use: No    Drug use:  No             Review of Systems    Positive for stated complaint: SOB  Other systems are a Labs Reviewed   BASIC METABOLIC PANEL (8) - Abnormal; Notable for the following components:       Result Value    Glucose 155 (*)     Potassium 3.4 (*)     All other components within normal limits   RESPIRATORY PANEL FLU EXPANDED - Abnormal; Notable f exams  Viewed and reviewed by myself and findings discussed with patient including need for follow up    Critical Care:  I spent a total of 30 minutes of critical care time in obtaining history, performing a physical exam, bedside monitoring of intervention

## 2020-02-28 NOTE — ED INITIAL ASSESSMENT (HPI)
SOB pt reports she has had for years but began having wheezing Friday with sinus pressure and LOBO. Neg flu test on MOnday at [address] [place] [address]. Pt increased prednisone dose after speaking with immunologist from [place].  + fevers

## 2020-02-29 LAB — POTASSIUM SERPL-SCNC: 4.3 MMOL/L (ref 3.5–5.1)

## 2020-02-29 PROCEDURE — 94660 CPAP INITIATION&MGMT: CPT

## 2020-02-29 PROCEDURE — 94640 AIRWAY INHALATION TREATMENT: CPT

## 2020-02-29 PROCEDURE — 84132 ASSAY OF SERUM POTASSIUM: CPT | Performed by: HOSPITALIST

## 2020-02-29 RX ORDER — POTASSIUM CHLORIDE 20 MEQ/1
40 TABLET, EXTENDED RELEASE ORAL EVERY 4 HOURS
Status: COMPLETED | OUTPATIENT
Start: 2020-02-29 | End: 2020-02-29

## 2020-02-29 RX ORDER — GUAIFENESIN/DEXTROMETHORPHAN 100-10MG/5
5 SYRUP ORAL EVERY 6 HOURS PRN
Status: DISCONTINUED | OUTPATIENT
Start: 2020-02-29 | End: 2020-03-02

## 2020-02-29 RX ORDER — METHYLPREDNISOLONE SODIUM SUCCINATE 40 MG/ML
40 INJECTION, POWDER, LYOPHILIZED, FOR SOLUTION INTRAMUSCULAR; INTRAVENOUS EVERY 12 HOURS
Status: DISCONTINUED | OUTPATIENT
Start: 2020-02-29 | End: 2020-03-01

## 2020-02-29 RX ORDER — MAGNESIUM SULFATE HEPTAHYDRATE 40 MG/ML
2 INJECTION, SOLUTION INTRAVENOUS ONCE
Status: COMPLETED | OUTPATIENT
Start: 2020-02-29 | End: 2020-02-29

## 2020-02-29 RX ORDER — KETOROLAC TROMETHAMINE 15 MG/ML
15 INJECTION, SOLUTION INTRAMUSCULAR; INTRAVENOUS ONCE
Status: COMPLETED | OUTPATIENT
Start: 2020-02-29 | End: 2020-02-29

## 2020-02-29 RX ORDER — METHYLPREDNISOLONE SODIUM SUCCINATE 40 MG/ML
40 INJECTION, POWDER, LYOPHILIZED, FOR SOLUTION INTRAMUSCULAR; INTRAVENOUS EVERY 8 HOURS
Status: DISCONTINUED | OUTPATIENT
Start: 2020-02-29 | End: 2020-02-29

## 2020-02-29 RX ORDER — ONDANSETRON 2 MG/ML
4 INJECTION INTRAMUSCULAR; INTRAVENOUS EVERY 6 HOURS PRN
Status: DISCONTINUED | OUTPATIENT
Start: 2020-02-29 | End: 2020-03-02

## 2020-02-29 NOTE — ED NOTES
Orders for admission, patient is aware of plan and ready to go upstairs.   Any questions, please call ED SUSU michelle at extension [phone]

## 2020-02-29 NOTE — PLAN OF CARE
Patient admitted with +rhinovirus, asthma exac. Remains on IV steriods and nebs. Will continue to monitor patient.        Problem: RESPIRATORY - ADULT  Goal: Achieves optimal ventilation and oxygenation  Description  INTERVENTIONS:  - Assess for changes in

## 2020-02-29 NOTE — CONSULTS
Pulmonary H&P/Consult     NAME: [address] [place] [address],[address]: [id number] - MRN: [id number] - Age: 54year old - :  1970    Date of Admission: 2020  4:43 PM  Admission Diagnosis: Moderate persistent asthma with exacerbation [J45.41]  Rhinovirus infec • OSTEOARTHRITIS    • Osteoarthritis    • OSTEOPOROSIS    • Osteoporosis    • OTHER DISEASES     Churg Brady Cunas Sydrome   • OTHER DISEASES     early cataracts   • OTHER DISEASES     pleurisy/pericarditis 2006   • OTHER DISEASES     secondary Cushing's Dis Comment:Other reaction(s): Vomiting  Zithromax               DIARRHEA, NAUSEA AND VOMITING    REVIEW OF SYSTEMS: 12 point review of system negative except per HPI    OBJECTIVE:No intake or output data in the 24 hours ending 02/29/20 1022  /73 (BP

## 2020-02-29 NOTE — PLAN OF CARE
VSS. Denies pain. Neb tx as ordered. IV solumedrol Q12H. Potassium covered per protocol - redraw at [facility] [address] Bed low and locked, call light within reach - able to make needs known.        Problem: Patient Centered Care  Goal: Patient preferences are identified anxiety  - Monitor for signs/symptoms of CO2 retention  Outcome: Progressing     Problem: RISK FOR INFECTION - ADULT  Goal: Absence of fever/infection during anticipated neutropenic period  Description  INTERVENTIONS  - Monitor WBC  - Administer growth fac

## 2020-02-29 NOTE — ED NOTES
Orders for admission, patient is aware of plan and ready to go upstairs.  Any questions, please call ED SUSU michelle at extension [phone]

## 2020-02-29 NOTE — RESPIRATORY THERAPY NOTE
KATELIN ASSESSMENT:    Pt does have a previous diagnosis of KATELIN. Pt does not routinely use a CPAP device at home.  This pt is suspected to be at high risk for KATELIN    CPAP INITIATION:    Pt to be placed on CPAP: yes  Pt refused: no

## 2020-02-29 NOTE — H&P
[facility] Hospitalist H&P       CC: Patient presents with:  Dyspnea IVORY SOB       PCP: Lisa Sen MD    History of Present Illness: Iesha Albright is a 54year old female with PMH sig for Churg elizabeth syndrome, eosinophilic gastroenteritis, recurrent p fractures of L arm 2002, pin removal 2003   • OTHER SURGICAL HISTORY      pleural biopsy        ALL:    Azithromycin                Comment:Other reaction(s): Vomiting  Zithromax               DIARRHEA, NAUSEA AND VOMITING     Home Medications:  No outpati    K 3.4*      CO2 28.0   BUN 15   CREATSERUM 0.87   *   CA 9.0       No results for input(s): ALT, AST, ALB, AMYLASE, LIPASE, LDH in the last 168 hours.     Invalid input(s): ALPHOS, TBIL, DBIL, TPROT    No results for input(s): TROP i

## 2020-03-01 LAB
ANION GAP SERPL CALC-SCNC: 7 MMOL/L (ref 0–18)
BASOPHILS # BLD: 0 X10(3) UL (ref 0–0.2)
BASOPHILS NFR BLD: 0 %
BUN BLD-MCNC: 22 MG/DL (ref 7–18)
BUN/CREAT SERPL: 23.2 (ref 10–20)
CALCIUM BLD-MCNC: 9.8 MG/DL (ref 8.5–10.1)
CHLORIDE SERPL-SCNC: 107 MMOL/L (ref 98–112)
CO2 SERPL-SCNC: 24 MMOL/L (ref 21–32)
CREAT BLD-MCNC: 0.95 MG/DL (ref 0.55–1.02)
DEPRECATED RDW RBC AUTO: 42.7 FL (ref 35.1–46.3)
EOSINOPHIL # BLD: 0.25 X10(3) UL (ref 0–0.7)
EOSINOPHIL NFR BLD: 1 %
ERYTHROCYTE [DISTWIDTH] IN BLOOD BY AUTOMATED COUNT: 13.4 % (ref 11–15)
GLUCOSE BLD-MCNC: 136 MG/DL (ref 70–99)
HAV IGM SER QL: 2.8 MG/DL (ref 1.6–2.6)
HCT VFR BLD AUTO: 39.3 % (ref 35–48)
HGB BLD-MCNC: 12.6 G/DL (ref 12–16)
LYMPHOCYTES NFR BLD: 1.48 X10(3) UL (ref 1–4)
LYMPHOCYTES NFR BLD: 6 %
MCH RBC QN AUTO: 27.9 PG (ref 26–34)
MCHC RBC AUTO-ENTMCNC: 32.1 G/DL (ref 31–37)
MCV RBC AUTO: 86.9 FL (ref 80–100)
METAMYELOCYTES # BLD: 0.49 X10(3) UL
METAMYELOCYTES NFR BLD: 2 %
MONOCYTES # BLD: 0.99 X10(3) UL (ref 0.1–1)
MONOCYTES NFR BLD: 4 %
MORPHOLOGY: NORMAL
NEUTROPHILS # BLD AUTO: 21.32 X10 (3) UL (ref 1.5–7.7)
NEUTROPHILS NFR BLD: 85 %
NEUTS BAND NFR BLD: 2 %
NEUTS HYPERSEG # BLD: 21.49 X10(3) UL (ref 1.5–7.7)
OSMOLALITY SERPL CALC.SUM OF ELEC: 291 MOSM/KG (ref 275–295)
PLATELET # BLD AUTO: 487 10(3)UL (ref 150–450)
PLATELET MORPHOLOGY: NORMAL
POTASSIUM SERPL-SCNC: 4.6 MMOL/L (ref 3.5–5.1)
RBC # BLD AUTO: 4.52 X10(6)UL (ref 3.8–5.3)
SODIUM SERPL-SCNC: 138 MMOL/L (ref 136–145)
TOTAL CELLS COUNTED: 100
WBC # BLD AUTO: 24.7 X10(3) UL (ref 4–11)

## 2020-03-01 PROCEDURE — 94640 AIRWAY INHALATION TREATMENT: CPT

## 2020-03-01 PROCEDURE — 85007 BL SMEAR W/DIFF WBC COUNT: CPT | Performed by: HOSPITALIST

## 2020-03-01 PROCEDURE — 83735 ASSAY OF MAGNESIUM: CPT | Performed by: HOSPITALIST

## 2020-03-01 PROCEDURE — 80048 BASIC METABOLIC PNL TOTAL CA: CPT | Performed by: HOSPITALIST

## 2020-03-01 PROCEDURE — 85025 COMPLETE CBC W/AUTO DIFF WBC: CPT | Performed by: HOSPITALIST

## 2020-03-01 PROCEDURE — 85027 COMPLETE CBC AUTOMATED: CPT | Performed by: HOSPITALIST

## 2020-03-01 RX ORDER — PREDNISONE 20 MG/1
40 TABLET ORAL
Status: DISCONTINUED | OUTPATIENT
Start: 2020-03-02 | End: 2020-03-02

## 2020-03-01 RX ORDER — ECHINACEA PURPUREA EXTRACT 125 MG
1 TABLET ORAL
Status: DISCONTINUED | OUTPATIENT
Start: 2020-03-01 | End: 2020-03-02

## 2020-03-01 RX ORDER — ENOXAPARIN SODIUM 100 MG/ML
30 INJECTION SUBCUTANEOUS EVERY 12 HOURS SCHEDULED
Status: DISCONTINUED | OUTPATIENT
Start: 2020-03-01 | End: 2020-03-02

## 2020-03-01 NOTE — PLAN OF CARE
Problem: Patient Centered Care  Goal: Patient preferences are identified and integrated in the patient's plan of care  Description  Interventions:  - What would you like us to know as we care for you? \"I am from home. \"  - Provide timely, complete, and period  Description  INTERVENTIONS  - Monitor WBC  - Administer growth factors as ordered  - Implement neutropenic guidelines  Outcome: Progressing

## 2020-03-01 NOTE — PROGRESS NOTES
[facility] Hospitalist Progress Note     CC: Hospital Follow up    PCP: Lemuel Jara MD       Assessment/Plan:     Principal Problem:     Moderate persistent asthma with exacerbation  Active Problems:    Rhinovirus infection    Robles Granados is a 57 year old No acute distress,    Heent: NC AT,    Pulm: Lungs no wheezing today  CV: Heart with regular rate and rhythm, no peripheral edema  Abd: Abdomen soft, nontender, nondistended   MSK: Full range of motion in extremities, no clubbing, no cyanosis  Skin: no paradise guaiFENesin-DM, Normal Saline Flush, acetaminophen, Ipratropium Bromide

## 2020-03-01 NOTE — PROGRESS NOTES
Pulmonary Progress Note      NAME: Codey Gimenez - ROOM: 491/323-W - MRN: [id number] - Age: 54year old - : 1970    Assessment/Plan:  1.  Eosinophilic granulomatosis with polyangiitis        - exacerbation 2/2 rhinovirus  - Will transition to PO clubbing or edema   Neuro: Interactive, answering questions appropriately, no focal deficits  Psych: Appropriate mood, appropriate affect  Heme/Lymph: No palpable axillary or supraclavicular nodes, no palpable/tender cords     Recent Labs   Lab 03/01/20  0

## 2020-03-01 NOTE — PLAN OF CARE
VSS. Denies pain. SpO2 stable on RA. Neb tx as scheduled. Pt will start on PO prednisone tomorrow. Ambulated in halls this afternoon. Up ad jia. Call light within reach - able to make needs known.        Problem: Patient Centered Care  Goal: Patient prefere Manage/alleviate anxiety  - Monitor for signs/symptoms of CO2 retention  Outcome: Progressing     Problem: RISK FOR INFECTION - ADULT  Goal: Absence of fever/infection during anticipated neutropenic period  Description  INTERVENTIONS  - Monitor WBC  - Admi

## 2020-03-01 NOTE — PROGRESS NOTES
[facility] Note:  Anticoagulation Weight Dose Adjustment for enoxaparin (LOVENOX)    Luiza Oviedo is a 54year old female who has been prescribed enoxaparin (LOVENOX) 40 mg every 24 hours.       Estimated Creatinine Clearance: 60.6 mL/min (based on SC

## 2020-03-02 VITALS
SYSTOLIC BLOOD PRESSURE: 121 MMHG | WEIGHT: 274.13 LBS | TEMPERATURE: 98 F | OXYGEN SATURATION: 96 % | HEART RATE: 81 BPM | BODY MASS INDEX: 48.57 KG/M2 | RESPIRATION RATE: 18 BRPM | DIASTOLIC BLOOD PRESSURE: 70 MMHG | HEIGHT: 63 IN

## 2020-03-02 PROCEDURE — 94640 AIRWAY INHALATION TREATMENT: CPT

## 2020-03-02 RX ORDER — PREDNISONE 10 MG/1
TABLET ORAL
Qty: 18 TABLET | Refills: 0 | Status: SHIPPED | OUTPATIENT
Start: 2020-03-03 | End: 2020-07-02

## 2020-03-02 NOTE — PLAN OF CARE
Problem: Patient Centered Care  Goal: Patient preferences are identified and integrated in the patient's plan of care  Description  Interventions:  - What would you like us to know as we care for you? \"I am from home. \"  - Provide timely, complete, and neutropenic period  Description  INTERVENTIONS  - Monitor WBC  - Administer growth factors as ordered  - Implement neutropenic guidelines  Outcome: Progressing

## 2020-03-02 NOTE — DISCHARGE SUMMARY
General Medicine Discharge Summary     Patient ID:  Tiffani Serrato  54year old  4/19/1970    Admit date: 2/28/2020    Discharge date and time: 3/2/2020    Attending Physician: Matthew Cruz MD     Consults: IP CONSULT TO HOSPITALIST  IP CONSULT TO Jorge Francois several days of fevers, cough, congestion and wheezing, noted to have asthma exacerbation 2/2 to rhino virus, improved with steroids and nebs, DC with steroid taper will fu with pulm upon DC.       Acute asthma exacerbation - improved  - 2/2 to rhinovirus drug:  benralizumab     Fluticasone Propionate 50 MCG/ACT Susp  Commonly known as:  FLONASE  SPRAY 2 SPRAYS INTO EACH NOSTRIL ONCE A DAY     Ipratropium Bromide 0.06 % Soln  Commonly known as:  ATROVENT  SPRAY 2 SPRAYS INTO EACH NOSTRIL TWICE DAILY AS NEED

## 2020-03-02 NOTE — PLAN OF CARE
Problem: Patient/Family Goals  Goal: Patient/Family Long Term Goal  Description  Patient's Long Term Goal: return home    Interventions:  - follow POC  - meds as ordered  - See additional Care Plan goals for specific interventions   Outcome: Adequate for

## 2020-03-02 NOTE — PROGRESS NOTES
Pulmonary Progress Note     Assessment / Plan:  1. EGPA - with exacerbation due to rhinovirus  - prednisone  - breo in lieu of symbicort  - bd protocol  2. Snoring  - PSG next week  3. Dispo  - okay to dc from pulm perspective.  She will see her pulmonologi

## 2020-05-20 ENCOUNTER — HOSPITAL ENCOUNTER (EMERGENCY)
Facility: HOSPITAL | Age: 50
Discharge: HOME | End: 2020-05-20
Attending: PHYSICIAN ASSISTANT
Payer: MEDICARE

## 2020-05-20 ENCOUNTER — APPOINTMENT (OUTPATIENT)
Dept: GENERAL RADIOLOGY | Facility: HOSPITAL | Age: 50
End: 2020-05-20
Attending: PHYSICIAN ASSISTANT
Payer: MEDICARE

## 2020-05-20 ENCOUNTER — APPOINTMENT (OUTPATIENT)
Dept: CT IMAGING | Facility: HOSPITAL | Age: 50
End: 2020-05-20
Attending: PHYSICIAN ASSISTANT
Payer: MEDICARE

## 2020-05-20 VITALS
SYSTOLIC BLOOD PRESSURE: 149 MMHG | HEART RATE: 84 BPM | RESPIRATION RATE: 16 BRPM | TEMPERATURE: 98 F | OXYGEN SATURATION: 97 % | DIASTOLIC BLOOD PRESSURE: 91 MMHG

## 2020-05-20 DIAGNOSIS — N83.202 CYSTS OF BOTH OVARIES: ICD-10-CM

## 2020-05-20 DIAGNOSIS — N83.201 CYSTS OF BOTH OVARIES: ICD-10-CM

## 2020-05-20 DIAGNOSIS — D72.18 CHURG-STRAUSS SYNDROME (HCC): ICD-10-CM

## 2020-05-20 DIAGNOSIS — R07.9 CHEST PAIN, UNSPECIFIED TYPE: ICD-10-CM

## 2020-05-20 DIAGNOSIS — R19.7 DIARRHEA, UNSPECIFIED TYPE: ICD-10-CM

## 2020-05-20 DIAGNOSIS — R10.9 ABDOMINAL PAIN, ACUTE: Primary | ICD-10-CM

## 2020-05-20 DIAGNOSIS — M30.1 CHURG-STRAUSS SYNDROME (HCC): ICD-10-CM

## 2020-05-20 PROCEDURE — 96361 HYDRATE IV INFUSION ADD-ON: CPT

## 2020-05-20 PROCEDURE — 96376 TX/PRO/DX INJ SAME DRUG ADON: CPT

## 2020-05-20 PROCEDURE — 85025 COMPLETE CBC W/AUTO DIFF WBC: CPT

## 2020-05-20 PROCEDURE — 85025 COMPLETE CBC W/AUTO DIFF WBC: CPT | Performed by: PHYSICIAN ASSISTANT

## 2020-05-20 PROCEDURE — 85379 FIBRIN DEGRADATION QUANT: CPT | Performed by: PHYSICIAN ASSISTANT

## 2020-05-20 PROCEDURE — 71260 CT THORAX DX C+: CPT | Performed by: PHYSICIAN ASSISTANT

## 2020-05-20 PROCEDURE — 93010 ELECTROCARDIOGRAM REPORT: CPT | Performed by: PHYSICIAN ASSISTANT

## 2020-05-20 PROCEDURE — 83690 ASSAY OF LIPASE: CPT

## 2020-05-20 PROCEDURE — 81001 URINALYSIS AUTO W/SCOPE: CPT | Performed by: PHYSICIAN ASSISTANT

## 2020-05-20 PROCEDURE — 83690 ASSAY OF LIPASE: CPT | Performed by: PHYSICIAN ASSISTANT

## 2020-05-20 PROCEDURE — 71045 X-RAY EXAM CHEST 1 VIEW: CPT | Performed by: PHYSICIAN ASSISTANT

## 2020-05-20 PROCEDURE — 96375 TX/PRO/DX INJ NEW DRUG ADDON: CPT

## 2020-05-20 PROCEDURE — 80053 COMPREHEN METABOLIC PANEL: CPT | Performed by: PHYSICIAN ASSISTANT

## 2020-05-20 PROCEDURE — 99285 EMERGENCY DEPT VISIT HI MDM: CPT

## 2020-05-20 PROCEDURE — 80053 COMPREHEN METABOLIC PANEL: CPT

## 2020-05-20 PROCEDURE — 96374 THER/PROPH/DIAG INJ IV PUSH: CPT

## 2020-05-20 PROCEDURE — 74177 CT ABD & PELVIS W/CONTRAST: CPT | Performed by: PHYSICIAN ASSISTANT

## 2020-05-20 PROCEDURE — 84484 ASSAY OF TROPONIN QUANT: CPT | Performed by: PHYSICIAN ASSISTANT

## 2020-05-20 PROCEDURE — 93005 ELECTROCARDIOGRAM TRACING: CPT

## 2020-05-20 RX ORDER — MORPHINE SULFATE 4 MG/ML
4 INJECTION, SOLUTION INTRAMUSCULAR; INTRAVENOUS ONCE
Status: COMPLETED | OUTPATIENT
Start: 2020-05-20 | End: 2020-05-20

## 2020-05-20 RX ORDER — ONDANSETRON 2 MG/ML
4 INJECTION INTRAMUSCULAR; INTRAVENOUS ONCE
Status: COMPLETED | OUTPATIENT
Start: 2020-05-20 | End: 2020-05-20

## 2020-05-20 NOTE — ED INITIAL ASSESSMENT (HPI)
Patient here for \"acute abdominal pain to my left side, including my spleen and going up. \" +Fever for several days. Hx Churgstrauss syndrome. Also having chest pain and states it is not costochondritis.

## 2020-05-21 NOTE — ED NOTES
Pt presents to ED for generalized abdominal pain that started last night. Pt states she has a hx of churgg elizabeth syndrome. Pt states she also has been having fevers and diarrhea. Pt denies vomiting or nausea.

## 2020-05-21 NOTE — ED PROVIDER NOTES
Patient Seen in: [facility] AND [facility] Emergency Department    History   Patient presents with:  Abdomen/Flank Pain  Fever    Stated Complaint: abd pain since yesterday     HPI    Juice Camp is a 52year old female who presents with chief complaint of OTHER SURGICAL HISTORY      fractures of L arm 2002, pin removal 2003   • OTHER SURGICAL HISTORY      pleural biopsy       Medications :   VENTOLIN  (90 Base) MCG/ACT Inhalation Aero Soln,  INHALE TWO puffs BY MOUTH INTO THE LUNGS EVERY 6 HOURS AS N ([other]) Father         \"dark patches on skin\"   • Diabetes Father    • Heart Disorder Father    • Other ([other]) Sister         psoriatic arthritis, \"adult-onset hydrocephalus\"   • Diabetes Sister    • Other ([other]) Brother         psoriatic arthr nondistended. There is no rebound tenderness or guarding. No organomegaly is noted. No peritoneal signs. Normal bowel sounds. No McBurney point tenderness. Negative Qureshi sign. Genitourinary: Not examined. Lymphatic: No gross lymphadenopathy noted. Abnormality         Status                     ---------                               -----------         ------                     STOOL CULTURE(P)[[id number]]                                                            SHIGATOXIN[3 precautions discussed with patient. Patient case discussed with Dr. Toby High.     Disposition and Plan     Clinical Impression:  Abdominal pain, acute  (primary encounter diagnosis)  Diarrhea, unspecified type  Churg-Salomon syndrome (HCC)  Chest pain, uns

## 2020-06-26 ENCOUNTER — HOSPITAL ENCOUNTER (EMERGENCY)
Facility: HOSPITAL | Age: 50
Discharge: HOME | End: 2020-06-26
Attending: EMERGENCY MEDICINE
Payer: MEDICARE

## 2020-06-26 ENCOUNTER — APPOINTMENT (OUTPATIENT)
Dept: CT IMAGING | Facility: HOSPITAL | Age: 50
End: 2020-06-26
Attending: EMERGENCY MEDICINE
Payer: MEDICARE

## 2020-06-26 ENCOUNTER — APPOINTMENT (OUTPATIENT)
Dept: GENERAL RADIOLOGY | Facility: HOSPITAL | Age: 50
End: 2020-06-26
Payer: MEDICARE

## 2020-06-26 VITALS
HEART RATE: 75 BPM | RESPIRATION RATE: 15 BRPM | DIASTOLIC BLOOD PRESSURE: 53 MMHG | SYSTOLIC BLOOD PRESSURE: 135 MMHG | TEMPERATURE: 98 F | OXYGEN SATURATION: 95 %

## 2020-06-26 DIAGNOSIS — Q76.49 VERTEBRAL ANOMALY: Primary | ICD-10-CM

## 2020-06-26 PROCEDURE — 96376 TX/PRO/DX INJ SAME DRUG ADON: CPT

## 2020-06-26 PROCEDURE — 96374 THER/PROPH/DIAG INJ IV PUSH: CPT

## 2020-06-26 PROCEDURE — 85379 FIBRIN DEGRADATION QUANT: CPT | Performed by: EMERGENCY MEDICINE

## 2020-06-26 PROCEDURE — 84484 ASSAY OF TROPONIN QUANT: CPT | Performed by: EMERGENCY MEDICINE

## 2020-06-26 PROCEDURE — 71260 CT THORAX DX C+: CPT | Performed by: EMERGENCY MEDICINE

## 2020-06-26 PROCEDURE — 93005 ELECTROCARDIOGRAM TRACING: CPT

## 2020-06-26 PROCEDURE — 93010 ELECTROCARDIOGRAM REPORT: CPT | Performed by: EMERGENCY MEDICINE

## 2020-06-26 PROCEDURE — 99285 EMERGENCY DEPT VISIT HI MDM: CPT

## 2020-06-26 PROCEDURE — 80048 BASIC METABOLIC PNL TOTAL CA: CPT | Performed by: EMERGENCY MEDICINE

## 2020-06-26 PROCEDURE — 71045 X-RAY EXAM CHEST 1 VIEW: CPT | Performed by: EMERGENCY MEDICINE

## 2020-06-26 PROCEDURE — 85025 COMPLETE CBC W/AUTO DIFF WBC: CPT | Performed by: EMERGENCY MEDICINE

## 2020-06-26 RX ORDER — HYDROCODONE BITARTRATE AND ACETAMINOPHEN 5; 325 MG/1; MG/1
1 TABLET ORAL EVERY 6 HOURS PRN
Qty: 10 TABLET | Refills: 0 | Status: SHIPPED | OUTPATIENT
Start: 2020-06-26 | End: 2020-07-03 | Stop reason: WASHOUT

## 2020-06-26 RX ORDER — MORPHINE SULFATE 4 MG/ML
4 INJECTION, SOLUTION INTRAMUSCULAR; INTRAVENOUS ONCE
Status: COMPLETED | OUTPATIENT
Start: 2020-06-26 | End: 2020-06-26

## 2020-06-26 NOTE — ED INITIAL ASSESSMENT (HPI)
Pt c/o left sided chest pain for past 2 days. Pain in the back starting last night. Pt also c/o chills and dry cough.

## 2020-06-27 NOTE — ED PROVIDER NOTES
Patient Seen in: [facility] AND [facility] Emergency Department      History   Patient presents with:  Chest Pain Angina  Back Pain    Stated Complaint: upper back pain, CP     HPI    47-year-old female presents for complaint of left-sided chest pain.   Pain is s Smokeless tobacco: Never Used    Alcohol use: No    Drug use: No             Review of Systems   Constitutional: Negative. HENT: Negative. Eyes: Negative. Respiratory: Negative. Cardiovascular: Negative. Gastrointestinal: Negative.     Genito No deformity. Lumbar back: She exhibits no swelling, no edema and no deformity. Right lower leg: No edema. Left lower leg: No edema. Comments: No rash   Skin:     General: Skin is warm and dry.    Neurological:      General: No focal def orders. RAINBOW DRAW BLUE   RAINBOW DRAW LAVENDER   RAINBOW DRAW LIGHT GREEN   RAINBOW DRAW GOLD     EKG    Rate, intervals and axes as noted on EKG Report. Rate: 83  Rhythm: Sinus Rhythm  Reading: no stemi, interpreted by ed physician. BONES: Old fracture deformities posterolateral mid left ribs. OTHER: Negative. CONCLUSION:  1. Unchanged chest.  No acute appearing disease.     Dictated by (CST): Juan M Sawant MD on 6/26/2020 at 3:47 PM     Finalized by (CST): Juan M Sawant MD intensity projection images were created.    FINDINGS: COMMENT: Overall examination quality is degraded by beam hardening artifact throughout the imaged volume secondary to patient body habitus and contact of the right lateral body wall with the scanner reji lateral aspect of the T8 vertebral segment and partially extending into the pedicle. This measures 1.2 x 1.8 x 2.1 cm. Multiapex scoliosis of the thoracic spine is appreciated with minimal endplate degenerative changes.  A remote, healed right lateral 9th MD ARIANA  [address] [place] [address] 114  [address] [phone]    Schedule an appointment as soon as possible for a visit in 3 days  For follow up and re-evaluation    We recommend that you schedule follow up care with a primary care provider wit

## 2020-06-27 NOTE — ED NOTES
Discharge instructions given to patient. Verbalized understanding. Patient in no distress. Patient's brother/friend is picking her up.   Patient left ED ambulatory steady gait

## 2020-09-16 PROCEDURE — 88342 IMHCHEM/IMCYTCHM 1ST ANTB: CPT | Performed by: INTERNAL MEDICINE

## 2020-09-16 PROCEDURE — 88321 CONSLTJ&REPRT SLD PREP ELSWR: CPT | Performed by: INTERNAL MEDICINE

## 2020-09-16 PROCEDURE — 88341 IMHCHEM/IMCYTCHM EA ADD ANTB: CPT | Performed by: INTERNAL MEDICINE

## 2020-09-16 PROCEDURE — 88307 TISSUE EXAM BY PATHOLOGIST: CPT | Performed by: INTERNAL MEDICINE

## 2020-12-03 PROBLEM — R06.02 SHORTNESS OF BREATH: Status: RESOLVED | Noted: 2018-12-15 | Resolved: 2020-12-03

## 2020-12-03 PROBLEM — J45.41 MODERATE PERSISTENT ASTHMA WITH EXACERBATION: Status: RESOLVED | Noted: 2020-02-28 | Resolved: 2020-12-03

## 2020-12-03 PROBLEM — J45.41 MODERATE PERSISTENT ASTHMA WITH EXACERBATION (HCC): Status: RESOLVED | Noted: 2020-02-28 | Resolved: 2020-12-03

## 2021-01-16 PROBLEM — L98.491 SKIN ULCER, LIMITED TO BREAKDOWN OF SKIN (HCC): Status: ACTIVE | Noted: 2021-01-16

## 2021-01-16 PROBLEM — R63.5 WEIGHT GAIN: Status: ACTIVE | Noted: 2017-03-08

## 2021-01-16 PROBLEM — R76.8 POSITIVE ANA (ANTINUCLEAR ANTIBODY): Status: ACTIVE | Noted: 2021-01-16

## 2021-01-19 PROBLEM — G93.32 CHRONIC FATIGUE SYNDROME: Status: ACTIVE | Noted: 2021-01-19

## 2021-01-19 PROBLEM — I10 ESSENTIAL HYPERTENSION, BENIGN: Status: ACTIVE | Noted: 2018-07-18

## 2021-01-19 PROBLEM — R53.82 CHRONIC FATIGUE SYNDROME: Status: ACTIVE | Noted: 2021-01-19

## 2021-01-19 PROBLEM — M85.89 OSTEOPENIA OF MULTIPLE SITES: Status: ACTIVE | Noted: 2017-12-20

## 2021-03-18 PROBLEM — E09.9 STEROID-INDUCED DIABETES MELLITUS, SUBSEQUENT ENCOUNTER (HCC): Status: ACTIVE | Noted: 2021-03-18

## 2021-03-18 PROBLEM — T38.0X5D STEROID-INDUCED DIABETES MELLITUS, SUBSEQUENT ENCOUNTER (HCC): Status: ACTIVE | Noted: 2021-03-18

## 2021-03-18 PROBLEM — E09.9: Status: ACTIVE | Noted: 2021-03-18

## 2021-03-18 PROBLEM — T38.0X5D: Status: ACTIVE | Noted: 2021-03-18

## 2021-03-23 ENCOUNTER — APPOINTMENT (OUTPATIENT)
Dept: GENERAL RADIOLOGY | Facility: HOSPITAL | Age: 51
End: 2021-03-23
Attending: EMERGENCY MEDICINE
Payer: MEDICARE

## 2021-03-23 ENCOUNTER — HOSPITAL ENCOUNTER (EMERGENCY)
Facility: HOSPITAL | Age: 51
Discharge: HOME | End: 2021-03-23
Attending: EMERGENCY MEDICINE
Payer: MEDICARE

## 2021-03-23 VITALS
HEART RATE: 78 BPM | SYSTOLIC BLOOD PRESSURE: 130 MMHG | BODY MASS INDEX: 51.91 KG/M2 | RESPIRATION RATE: 21 BRPM | TEMPERATURE: 99 F | DIASTOLIC BLOOD PRESSURE: 52 MMHG | OXYGEN SATURATION: 100 % | WEIGHT: 293 LBS | HEIGHT: 63 IN

## 2021-03-23 DIAGNOSIS — S43.015A ANTERIOR DISLOCATION OF LEFT SHOULDER, INITIAL ENCOUNTER: Primary | ICD-10-CM

## 2021-03-23 PROCEDURE — 96372 THER/PROPH/DIAG INJ SC/IM: CPT

## 2021-03-23 PROCEDURE — 73060 X-RAY EXAM OF HUMERUS: CPT | Performed by: EMERGENCY MEDICINE

## 2021-03-23 PROCEDURE — 71045 X-RAY EXAM CHEST 1 VIEW: CPT | Performed by: EMERGENCY MEDICINE

## 2021-03-23 PROCEDURE — 23650 CLTX SHO DSLC W/MNPJ WO ANES: CPT

## 2021-03-23 PROCEDURE — 96375 TX/PRO/DX INJ NEW DRUG ADDON: CPT

## 2021-03-23 PROCEDURE — 73030 X-RAY EXAM OF SHOULDER: CPT | Performed by: EMERGENCY MEDICINE

## 2021-03-23 PROCEDURE — 96376 TX/PRO/DX INJ SAME DRUG ADON: CPT

## 2021-03-23 PROCEDURE — 96374 THER/PROPH/DIAG INJ IV PUSH: CPT

## 2021-03-23 PROCEDURE — 99285 EMERGENCY DEPT VISIT HI MDM: CPT

## 2021-03-23 RX ORDER — HYDROCODONE BITARTRATE AND ACETAMINOPHEN 5; 325 MG/1; MG/1
1 TABLET ORAL ONCE
Status: DISCONTINUED | OUTPATIENT
Start: 2021-03-23 | End: 2021-03-23

## 2021-03-23 RX ORDER — MIDAZOLAM HYDROCHLORIDE 10 MG/2ML
6 INJECTION, SOLUTION INTRAMUSCULAR; INTRAVENOUS ONCE
Status: COMPLETED | OUTPATIENT
Start: 2021-03-23 | End: 2021-03-23

## 2021-03-23 RX ORDER — HYDROCODONE BITARTRATE AND ACETAMINOPHEN 5; 325 MG/1; MG/1
1 TABLET ORAL EVERY 4 HOURS PRN
Qty: 10 TABLET | Refills: 0 | Status: SHIPPED | OUTPATIENT
Start: 2021-03-23 | End: 2021-04-02 | Stop reason: WASHOUT

## 2021-03-23 RX ORDER — MORPHINE SULFATE 4 MG/ML
4 INJECTION, SOLUTION INTRAMUSCULAR; INTRAVENOUS ONCE
Status: COMPLETED | OUTPATIENT
Start: 2021-03-23 | End: 2021-03-23

## 2021-03-23 RX ORDER — MIDAZOLAM HYDROCHLORIDE 1 MG/ML
INJECTION INTRAMUSCULAR; INTRAVENOUS
Status: COMPLETED | OUTPATIENT
Start: 2021-03-23 | End: 2021-03-23

## 2021-03-23 NOTE — ED PROVIDER NOTES
Patient Seen in: [facility] AND [facility] Emergency Department      History   Patient presents with:  Arm or Hand Injury    Stated Complaint: shoulder pain L    HPI/Subjective:   HPI    Patient presents the emergency department complaining of left shoulder pain tenderness. Musculoskeletal:      Cervical back: Normal range of motion and neck supple. Comments: Intrinsic muscles that hand on the left are intact with strong pulses and normal. No tenderness in forearm or elbow. Shoulder diffusely tender.    Skin relation to the glenoid. No well-defined fracture. Post reduction imaging advised.     Dictated by (CST): Dayana Lynne MD on 3/23/2021 at 1:02 PM     Finalized by (CST): Dayana Lynne MD on 3/23/2021 at 1:03 PM            Radiology exams  Viewed and re initial encounter  (primary encounter diagnosis)    Disposition:  Discharge  3/23/2021  3:07 pm    Follow-up:  Ilana Pond, [address] [address]  [phone]    Schedule an appointment as soon as possible for a visit

## 2021-03-23 NOTE — ED INITIAL ASSESSMENT (HPI)
Pt via EMS from home for complaints of pain to left shoulder after fall on kitchen floor 30 minutes prior to arrival.  No LOC.

## 2021-03-23 NOTE — ED NOTES
Assumed care of patient from EMS. Patient to ED from home for fall, left arm pain. Patient states that she slipped on the floor, hit her left arm on a cabinet, and then fell onto her left arm.  Patient complains of pain to left upper arm radiating into left

## 2021-07-07 PROBLEM — E66.01 CLASS 3 OBESITY (HCC): Status: ACTIVE | Noted: 2021-01-28

## 2021-07-07 PROBLEM — E66.9 CLASS 3 OBESITY: Status: ACTIVE | Noted: 2021-01-28

## 2021-08-20 ENCOUNTER — OFFICE VISIT (OUTPATIENT)
Dept: ENDOCRINOLOGY CLINIC | Facility: CLINIC | Age: 51
End: 2021-08-20
Payer: MEDICARE

## 2021-08-20 ENCOUNTER — HOSPITAL ENCOUNTER (OUTPATIENT)
Dept: ENDOCRINOLOGY | Facility: HOSPITAL | Age: 51
Discharge: HOME | End: 2021-08-20
Attending: INTERNAL MEDICINE
Payer: MEDICARE

## 2021-08-20 VITALS — WEIGHT: 284 LBS | BODY MASS INDEX: 50 KG/M2

## 2021-08-20 VITALS
HEIGHT: 63 IN | SYSTOLIC BLOOD PRESSURE: 136 MMHG | WEIGHT: 284 LBS | BODY MASS INDEX: 50.32 KG/M2 | HEART RATE: 86 BPM | RESPIRATION RATE: 18 BRPM | DIASTOLIC BLOOD PRESSURE: 67 MMHG

## 2021-08-20 DIAGNOSIS — R73.01 IMPAIRED FASTING GLUCOSE: ICD-10-CM

## 2021-08-20 DIAGNOSIS — E11.65 TYPE 2 DIABETES MELLITUS WITH HYPERGLYCEMIA, WITHOUT LONG-TERM CURRENT USE OF INSULIN (HCC): Primary | ICD-10-CM

## 2021-08-20 DIAGNOSIS — E04.2 MULTINODULAR GOITER: Primary | ICD-10-CM

## 2021-08-20 DIAGNOSIS — E24.2 IATROGENIC CUSHING'S DISEASE (HCC): ICD-10-CM

## 2021-08-20 DIAGNOSIS — L68.0 HIRSUTISM: ICD-10-CM

## 2021-08-20 PROCEDURE — 99204 OFFICE O/P NEW MOD 45 MIN: CPT | Performed by: INTERNAL MEDICINE

## 2021-08-20 RX ORDER — SPIRONOLACTONE 50 MG/1
50 TABLET, FILM COATED ORAL DAILY
Qty: 90 TABLET | Refills: 1 | Status: SHIPPED | OUTPATIENT
Start: 2021-08-20 | End: 2021-12-16

## 2021-08-20 RX ORDER — SEMAGLUTIDE 1.34 MG/ML
0.5 INJECTION, SOLUTION SUBCUTANEOUS WEEKLY
Qty: 1.5 ML | Refills: 3 | Status: SHIPPED | OUTPATIENT
Start: 2021-08-20 | End: 2021-11-17

## 2021-08-20 NOTE — PROGRESS NOTES
María Elena Chatman  : 1970 attended individual initial assessment for Diabetes Education:    Date: 2021   Start time: 2:30 pm  End time: 3:30 pm    HEMOGLOBIN A1C (%)   Date Value   2010 6.2     Hemoglobin A1c (%)   Date Value   2011 signed up for afternoon classes for Sept 9,2021      Written materials provided for all areas covered. Patient verbalized understanding and has no further questions at this time.     Dionisio Voss, RN, MSN

## 2021-08-20 NOTE — PROGRESS NOTES
Name: Iesha Albright  Date: 8/20/2021    Referring Physician: Calixto Gomez    Patient presents with:  Consult: for pre-diabetes, thyroid issues, cushing's, vasculitis, and adrenal tumor; has been on steroids over 40 years.        HISTORY OF PRESEN or palpitations  Gastrointestinal:  no abdominal pain, bowel movement problems  Musculoskeletal: no muscle pain or arthralgia  /Gyne: no frequency or discomfort while urinating  Psychiatric:  no acute distress, anxiety  or depression  Skin: normal moistu acid 100 MG Oral Tab, Take 100 mg by mouth daily. , Disp: , Rfl:   •  Azelastine HCl 0.1 % Nasal Solution, 2 sprays by Nasal route 2 (two) times daily. , Disp: , Rfl:   •  predniSONE 10 MG Oral Tab, Take 5 mg by mouth daily.   , Disp: , Rfl:   •  Cyanocobalam Medical History:   Diagnosis Date   • ALLERGIC RHINITIS    • Anxiety    • Arthralgia    • ASTHMA    • Asthma    • Bronchitis    • DEPRESSION    • Depression    • Disorder of thyroid     NODULES   • GERD     eosinophilic gastroenteritis   • High blood press Iatrogenic Cushings Disease  - Unfortunately due to long term prednisone use   - Discussed she should not decrease prednisone under 5mg due to increased risk of adrenal insufficiency    2.  Thyroid Nodules  -Discussed common occurrence of thyroid nodules in

## 2021-09-04 ENCOUNTER — APPOINTMENT (OUTPATIENT)
Dept: CT IMAGING | Facility: HOSPITAL | Age: 51
End: 2021-09-04
Attending: EMERGENCY MEDICINE
Payer: MEDICARE

## 2021-09-04 ENCOUNTER — APPOINTMENT (OUTPATIENT)
Dept: GENERAL RADIOLOGY | Facility: HOSPITAL | Age: 51
End: 2021-09-04
Attending: EMERGENCY MEDICINE
Payer: MEDICARE

## 2021-09-04 ENCOUNTER — HOSPITAL ENCOUNTER (EMERGENCY)
Facility: HOSPITAL | Age: 51
Discharge: HOME | End: 2021-09-04
Attending: EMERGENCY MEDICINE
Payer: MEDICARE

## 2021-09-04 VITALS
DIASTOLIC BLOOD PRESSURE: 82 MMHG | RESPIRATION RATE: 22 BRPM | TEMPERATURE: 98 F | OXYGEN SATURATION: 97 % | SYSTOLIC BLOOD PRESSURE: 129 MMHG | WEIGHT: 286.63 LBS | BODY MASS INDEX: 51 KG/M2 | HEART RATE: 83 BPM

## 2021-09-04 DIAGNOSIS — R10.13 DYSPEPSIA: Primary | ICD-10-CM

## 2021-09-04 LAB
ALBUMIN SERPL-MCNC: 3.9 G/DL (ref 3.4–5)
ALP LIVER SERPL-CCNC: 106 U/L
ALT SERPL-CCNC: 37 U/L
ANION GAP SERPL CALC-SCNC: 9 MMOL/L (ref 0–18)
AST SERPL-CCNC: 20 U/L (ref 15–37)
BASOPHILS # BLD AUTO: 0.04 X10(3) UL (ref 0–0.2)
BASOPHILS NFR BLD AUTO: 0.3 %
BILIRUB DIRECT SERPL-MCNC: 0.2 MG/DL (ref 0–0.2)
BILIRUB SERPL-MCNC: 0.7 MG/DL (ref 0.1–2)
BILIRUB UR QL: NEGATIVE
BUN BLD-MCNC: 16 MG/DL (ref 7–18)
BUN/CREAT SERPL: 16 (ref 10–20)
CALCIUM BLD-MCNC: 9.8 MG/DL (ref 8.5–10.1)
CHLORIDE SERPL-SCNC: 102 MMOL/L (ref 98–112)
CLARITY UR: CLEAR
CO2 SERPL-SCNC: 26 MMOL/L (ref 21–32)
COLOR UR: COLORLESS
CREAT BLD-MCNC: 1 MG/DL
DEPRECATED RDW RBC AUTO: 41.5 FL (ref 35.1–46.3)
EOSINOPHIL # BLD AUTO: 0.23 X10(3) UL (ref 0–0.7)
EOSINOPHIL NFR BLD AUTO: 1.5 %
ERYTHROCYTE [DISTWIDTH] IN BLOOD BY AUTOMATED COUNT: 13.6 % (ref 11–15)
GLUCOSE BLD-MCNC: 92 MG/DL (ref 70–99)
GLUCOSE UR-MCNC: NEGATIVE MG/DL
HCT VFR BLD AUTO: 43.5 %
HGB BLD-MCNC: 14.1 G/DL
HGB UR QL STRIP.AUTO: NEGATIVE
IMM GRANULOCYTES # BLD AUTO: 0.12 X10(3) UL (ref 0–1)
IMM GRANULOCYTES NFR BLD: 0.8 %
KETONES UR-MCNC: NEGATIVE MG/DL
LEUKOCYTE ESTERASE UR QL STRIP.AUTO: NEGATIVE
LIPASE SERPL-CCNC: 244 U/L (ref 73–393)
LYMPHOCYTES # BLD AUTO: 1.04 X10(3) UL (ref 1–4)
LYMPHOCYTES NFR BLD AUTO: 6.8 %
M PROTEIN MFR SERPL ELPH: 8.1 G/DL (ref 6.4–8.2)
MCH RBC QN AUTO: 27.2 PG (ref 26–34)
MCHC RBC AUTO-ENTMCNC: 32.4 G/DL (ref 31–37)
MCV RBC AUTO: 83.8 FL
MONOCYTES # BLD AUTO: 1.04 X10(3) UL (ref 0.1–1)
MONOCYTES NFR BLD AUTO: 6.8 %
NEUTROPHILS # BLD AUTO: 12.77 X10 (3) UL (ref 1.5–7.7)
NEUTROPHILS # BLD AUTO: 12.77 X10(3) UL (ref 1.5–7.7)
NEUTROPHILS NFR BLD AUTO: 83.8 %
NITRITE UR QL STRIP.AUTO: NEGATIVE
OSMOLALITY SERPL CALC.SUM OF ELEC: 285 MOSM/KG (ref 275–295)
PH UR: 6 [PH] (ref 5–8)
PLATELET # BLD AUTO: 455 10(3)UL (ref 150–450)
POTASSIUM SERPL-SCNC: 4.1 MMOL/L (ref 3.5–5.1)
PROT UR-MCNC: NEGATIVE MG/DL
RBC # BLD AUTO: 5.19 X10(6)UL
SODIUM SERPL-SCNC: 137 MMOL/L (ref 136–145)
SP GR UR STRIP: 1 (ref 1–1.03)
TROPONIN I SERPL-MCNC: <0.045 NG/ML (ref ?–0.04)
UROBILINOGEN UR STRIP-ACNC: <2
WBC # BLD AUTO: 15.2 X10(3) UL (ref 4–11)

## 2021-09-04 PROCEDURE — 81003 URINALYSIS AUTO W/O SCOPE: CPT | Performed by: EMERGENCY MEDICINE

## 2021-09-04 PROCEDURE — 93010 ELECTROCARDIOGRAM REPORT: CPT | Performed by: EMERGENCY MEDICINE

## 2021-09-04 PROCEDURE — 99284 EMERGENCY DEPT VISIT MOD MDM: CPT

## 2021-09-04 PROCEDURE — 80048 BASIC METABOLIC PNL TOTAL CA: CPT | Performed by: EMERGENCY MEDICINE

## 2021-09-04 PROCEDURE — 83690 ASSAY OF LIPASE: CPT | Performed by: EMERGENCY MEDICINE

## 2021-09-04 PROCEDURE — 80076 HEPATIC FUNCTION PANEL: CPT | Performed by: EMERGENCY MEDICINE

## 2021-09-04 PROCEDURE — 85025 COMPLETE CBC W/AUTO DIFF WBC: CPT | Performed by: EMERGENCY MEDICINE

## 2021-09-04 PROCEDURE — 93005 ELECTROCARDIOGRAM TRACING: CPT

## 2021-09-04 PROCEDURE — 71045 X-RAY EXAM CHEST 1 VIEW: CPT | Performed by: EMERGENCY MEDICINE

## 2021-09-04 PROCEDURE — 36415 COLL VENOUS BLD VENIPUNCTURE: CPT

## 2021-09-04 PROCEDURE — 74177 CT ABD & PELVIS W/CONTRAST: CPT | Performed by: EMERGENCY MEDICINE

## 2021-09-04 PROCEDURE — 84484 ASSAY OF TROPONIN QUANT: CPT | Performed by: EMERGENCY MEDICINE

## 2021-09-04 RX ORDER — LANSOPRAZOLE 30 MG/1
30 CAPSULE, DELAYED RELEASE ORAL
Qty: 14 CAPSULE | Refills: 0 | Status: SHIPPED | OUTPATIENT
Start: 2021-09-04 | End: 2021-09-18

## 2021-09-05 LAB — SARS-COV-2 RNA RESP QL NAA+PROBE: NOT DETECTED

## 2021-09-05 NOTE — ED PROVIDER NOTES
Patient Seen in: [facility] AND [facility] Emergency Department      History   Patient presents with:  Abdominal Pain    Stated Complaint: Stomach pain    HPI/Subjective:   HPI    68-year-old female with history of vasculitis on steroids here for evaluation of e reviewed. Differential diagnosis includes dyspepsia, gastritis, pancreatitis, biliary colic, atypical chest pain.       ED Course     Labs Reviewed   HEPATIC FUNCTION PANEL (7) - Abnormal; Notable for the following components:       Result Value    Alkal available Radiologist.    Glynn Boone M.D. This report has been electronically signed and verified by the Radiologist whose name is printed above.     DD:  09/04/2021/DT:  09/04/2021            CT abdomen pelvis with IV contrast    IMPRESSION:    No a

## 2021-09-05 NOTE — ED INITIAL ASSESSMENT (HPI)
Pt states just after she ate this evening she became nauseated with stomach pain. Nausea unrelieved by zofran. Concerned about her medications possibly causing a side effect. . Pt states she is also having some chest pain.  When asked if it's the same pain a

## 2021-09-13 ENCOUNTER — HOSPITAL ENCOUNTER (EMERGENCY)
Facility: HOSPITAL | Age: 51
Discharge: HOME | End: 2021-09-13
Attending: EMERGENCY MEDICINE
Payer: MEDICARE

## 2021-09-13 VITALS
RESPIRATION RATE: 16 BRPM | OXYGEN SATURATION: 100 % | WEIGHT: 285 LBS | DIASTOLIC BLOOD PRESSURE: 49 MMHG | HEIGHT: 63 IN | HEART RATE: 72 BPM | SYSTOLIC BLOOD PRESSURE: 112 MMHG | BODY MASS INDEX: 50.5 KG/M2 | TEMPERATURE: 99 F

## 2021-09-13 DIAGNOSIS — R10.12 LUQ ABDOMINAL PAIN: Primary | ICD-10-CM

## 2021-09-13 LAB
ALBUMIN SERPL-MCNC: 3.9 G/DL (ref 3.4–5)
ALBUMIN/GLOB SERPL: 0.9 {RATIO} (ref 1–2)
ALP LIVER SERPL-CCNC: 114 U/L
ALT SERPL-CCNC: 48 U/L
ANION GAP SERPL CALC-SCNC: 10 MMOL/L (ref 0–18)
AST SERPL-CCNC: 24 U/L (ref 15–37)
B-HCG UR QL: NEGATIVE
BASOPHILS # BLD AUTO: 0.05 X10(3) UL (ref 0–0.2)
BASOPHILS NFR BLD AUTO: 0.4 %
BILIRUB SERPL-MCNC: 0.6 MG/DL (ref 0.1–2)
BUN BLD-MCNC: 15 MG/DL (ref 7–18)
BUN/CREAT SERPL: 15 (ref 10–20)
CALCIUM BLD-MCNC: 9.9 MG/DL (ref 8.5–10.1)
CHLORIDE SERPL-SCNC: 100 MMOL/L (ref 98–112)
CO2 SERPL-SCNC: 26 MMOL/L (ref 21–32)
CREAT BLD-MCNC: 1 MG/DL
DEPRECATED RDW RBC AUTO: 41.9 FL (ref 35.1–46.3)
EOSINOPHIL # BLD AUTO: 0.3 X10(3) UL (ref 0–0.7)
EOSINOPHIL NFR BLD AUTO: 2.3 %
ERYTHROCYTE [DISTWIDTH] IN BLOOD BY AUTOMATED COUNT: 13.4 % (ref 11–15)
GLOBULIN PLAS-MCNC: 4.2 G/DL (ref 2.8–4.4)
GLUCOSE BLD-MCNC: 96 MG/DL (ref 70–99)
HCT VFR BLD AUTO: 44.2 %
HGB BLD-MCNC: 14.2 G/DL
IMM GRANULOCYTES # BLD AUTO: 0.07 X10(3) UL (ref 0–1)
IMM GRANULOCYTES NFR BLD: 0.5 %
LIPASE SERPL-CCNC: 436 U/L (ref 73–393)
LYMPHOCYTES # BLD AUTO: 0.95 X10(3) UL (ref 1–4)
LYMPHOCYTES NFR BLD AUTO: 7.2 %
M PROTEIN MFR SERPL ELPH: 8.1 G/DL (ref 6.4–8.2)
MCH RBC QN AUTO: 27.6 PG (ref 26–34)
MCHC RBC AUTO-ENTMCNC: 32.1 G/DL (ref 31–37)
MCV RBC AUTO: 85.8 FL
MONOCYTES # BLD AUTO: 1.01 X10(3) UL (ref 0.1–1)
MONOCYTES NFR BLD AUTO: 7.7 %
NEUTROPHILS # BLD AUTO: 10.73 X10 (3) UL (ref 1.5–7.7)
NEUTROPHILS # BLD AUTO: 10.73 X10(3) UL (ref 1.5–7.7)
NEUTROPHILS NFR BLD AUTO: 81.9 %
OSMOLALITY SERPL CALC.SUM OF ELEC: 283 MOSM/KG (ref 275–295)
PLATELET # BLD AUTO: 415 10(3)UL (ref 150–450)
POTASSIUM SERPL-SCNC: 4.5 MMOL/L (ref 3.5–5.1)
RBC # BLD AUTO: 5.15 X10(6)UL
SODIUM SERPL-SCNC: 136 MMOL/L (ref 136–145)
WBC # BLD AUTO: 13.1 X10(3) UL (ref 4–11)

## 2021-09-13 PROCEDURE — 80053 COMPREHEN METABOLIC PANEL: CPT | Performed by: EMERGENCY MEDICINE

## 2021-09-13 PROCEDURE — 85025 COMPLETE CBC W/AUTO DIFF WBC: CPT | Performed by: EMERGENCY MEDICINE

## 2021-09-13 PROCEDURE — 96374 THER/PROPH/DIAG INJ IV PUSH: CPT

## 2021-09-13 PROCEDURE — 83690 ASSAY OF LIPASE: CPT | Performed by: EMERGENCY MEDICINE

## 2021-09-13 PROCEDURE — 81025 URINE PREGNANCY TEST: CPT

## 2021-09-13 PROCEDURE — 99284 EMERGENCY DEPT VISIT MOD MDM: CPT

## 2021-09-13 RX ORDER — MORPHINE SULFATE 4 MG/ML
4 INJECTION, SOLUTION INTRAMUSCULAR; INTRAVENOUS ONCE
Status: COMPLETED | OUTPATIENT
Start: 2021-09-13 | End: 2021-09-13

## 2021-09-13 RX ORDER — HYDROCODONE BITARTRATE AND ACETAMINOPHEN 5; 325 MG/1; MG/1
1 TABLET ORAL EVERY 6 HOURS PRN
Qty: 15 TABLET | Refills: 0 | Status: SHIPPED | OUTPATIENT
Start: 2021-09-13

## 2021-09-13 RX ORDER — MAGNESIUM HYDROXIDE/ALUMINUM HYDROXICE/SIMETHICONE 120; 1200; 1200 MG/30ML; MG/30ML; MG/30ML
30 SUSPENSION ORAL ONCE
Status: COMPLETED | OUTPATIENT
Start: 2021-09-13 | End: 2021-09-13

## 2021-09-13 NOTE — ED INITIAL ASSESSMENT (HPI)
Patient states left upper abdominal pain for a few weeks and left flank pain starting this am. States a cough for a few weeks.

## 2021-09-13 NOTE — ED QUICK NOTES
Assumed care of patient from triage. Patient to ED from home for LUQ abdominal pain, left back pain. Patient states that she has been having LUQ abdominal pain x2 weeks, seen here for same complaint.  Patient also c/o pain to left side of back radiating for

## 2021-09-15 NOTE — ED PROVIDER NOTES
Patient Seen in: [facility] AND [facility] Emergency Department    History   Patient presents with:  Abdomen/Flank Pain      HPI    Patient presents to the ED complaining of pain to her left upper abdomen for the past several weeks.   Pain is constant and not bet Diabetes Father    • Heart Disorder Father    • Other ([other]) Sister         psoriatic arthritis, \"adult-onset hydrocephalus\"   • Diabetes Sister    • Other ([other]) Brother         psoriatic arthritis, vitiligo   • Diabetes Brother    • Diabetes Moth normal. No respiratory distress. Breath sounds: Normal breath sounds. No stridor. Abdominal:      General: There is no distension. Palpations: Abdomen is soft. Tenderness: There is abdominal tenderness in the left lower quadrant.  There is Medications   Alum & Mag Hydroxide-Simeth (MAALOX) oral suspension 30 mL (30 mL Oral Given 9/13/21 1530)   morphINE sulfate (PF) 4 MG/ML injection 4 mg (4 mg Intravenous Given 9/13/21 1530)         [facility]      09/13/21  1327 09/13/21  1338 09/13/21  1500 09 days        Medications Prescribed:  Discharge Medication List as of 9/13/2021  4:23 PM    START taking these medications    HYDROcodone-acetaminophen 5-325 MG Oral Tab  Take 1 tablet by mouth every 6 (six) hours as needed for Pain., Normal, Disp-15 tablet

## 2021-10-06 ENCOUNTER — APPOINTMENT (OUTPATIENT)
Dept: ENDOCRINOLOGY | Facility: HOSPITAL | Age: 51
End: 2021-10-06
Attending: INTERNAL MEDICINE
Payer: MEDICARE

## 2021-10-07 ENCOUNTER — TELEPHONE (OUTPATIENT)
Dept: ENDOCRINOLOGY CLINIC | Facility: CLINIC | Age: 51
End: 2021-10-07

## 2021-10-07 NOTE — TELEPHONE ENCOUNTER
Received Diabetes Education Order/Referral form from [facility]. Placed on providers desk for signature.

## 2021-10-14 ENCOUNTER — APPOINTMENT (OUTPATIENT)
Dept: ENDOCRINOLOGY | Facility: HOSPITAL | Age: 51
End: 2021-10-14
Attending: INTERNAL MEDICINE
Payer: MEDICARE

## 2021-10-20 PROBLEM — R13.10 DYSPHAGIA: Status: ACTIVE | Noted: 2020-06-01

## 2021-10-21 ENCOUNTER — APPOINTMENT (OUTPATIENT)
Dept: ENDOCRINOLOGY | Facility: HOSPITAL | Age: 51
End: 2021-10-21
Attending: INTERNAL MEDICINE
Payer: MEDICARE

## 2021-11-04 ENCOUNTER — APPOINTMENT (OUTPATIENT)
Dept: ENDOCRINOLOGY | Facility: HOSPITAL | Age: 51
End: 2021-11-04
Attending: INTERNAL MEDICINE
Payer: MEDICARE

## 2021-11-10 ENCOUNTER — APPOINTMENT (OUTPATIENT)
Dept: ENDOCRINOLOGY | Facility: HOSPITAL | Age: 51
End: 2021-11-10
Attending: INTERNAL MEDICINE
Payer: MEDICARE

## 2021-11-11 ENCOUNTER — APPOINTMENT (OUTPATIENT)
Dept: ENDOCRINOLOGY | Facility: HOSPITAL | Age: 51
End: 2021-11-11
Attending: INTERNAL MEDICINE
Payer: MEDICARE

## 2021-11-17 RX ORDER — SEMAGLUTIDE 1.34 MG/ML
0.5 INJECTION, SOLUTION SUBCUTANEOUS WEEKLY
Qty: 1.5 ML | Refills: 2 | Status: SHIPPED | OUTPATIENT
Start: 2021-11-17

## 2021-11-18 ENCOUNTER — APPOINTMENT (OUTPATIENT)
Dept: ENDOCRINOLOGY | Facility: HOSPITAL | Age: 51
End: 2021-11-18
Attending: INTERNAL MEDICINE
Payer: MEDICARE

## 2021-11-22 ENCOUNTER — APPOINTMENT (OUTPATIENT)
Dept: ENDOCRINOLOGY | Facility: HOSPITAL | Age: 51
End: 2021-11-22
Attending: INTERNAL MEDICINE
Payer: MEDICARE

## 2021-12-09 ENCOUNTER — APPOINTMENT (OUTPATIENT)
Dept: ENDOCRINOLOGY | Facility: HOSPITAL | Age: 51
End: 2021-12-09
Attending: INTERNAL MEDICINE
Payer: MEDICARE

## 2021-12-16 ENCOUNTER — PATIENT MESSAGE (OUTPATIENT)
Dept: ENDOCRINOLOGY CLINIC | Facility: CLINIC | Age: 51
End: 2021-12-16

## 2021-12-16 RX ORDER — SPIRONOLACTONE 50 MG/1
50 TABLET, FILM COATED ORAL DAILY
Qty: 90 TABLET | Refills: 0 | Status: SHIPPED | OUTPATIENT
Start: 2021-12-16

## 2021-12-16 NOTE — TELEPHONE ENCOUNTER
Refill request.  LOV 8/20/21  FU 3/18/22    Ozempic Rx was sent 11/17/21, notified patient to check with pharmacy.

## 2021-12-16 NOTE — TELEPHONE ENCOUNTER
From: Robles Granados  To: Licha Juarez MD  Sent: 12/16/2021 1:20 PM CST  Subject: COVID-19 and vasculitis    Hi Dr. Demarcus Saini,   I have both [address] [facility] and EGPA flareup and my other specialists ordered me home for the remainder of the year.  Sadly, I had to in

## 2021-12-17 ENCOUNTER — PATIENT MESSAGE (OUTPATIENT)
Dept: ENDOCRINOLOGY CLINIC | Facility: CLINIC | Age: 51
End: 2021-12-17

## 2021-12-17 NOTE — TELEPHONE ENCOUNTER
From: Frnakie Contreras  Sent: 12/17/2021 12:27 AM CST  To: Zakia Dhillon Clinical Staff  Subject: NXAEI-52 and vasculitis    Thank you

## 2021-12-30 ENCOUNTER — APPOINTMENT (OUTPATIENT)
Dept: ENDOCRINOLOGY | Facility: HOSPITAL | Age: 51
End: 2021-12-30
Attending: INTERNAL MEDICINE
Payer: MEDICARE

## 2022-01-19 ENCOUNTER — PATIENT MESSAGE (OUTPATIENT)
Dept: ENDOCRINOLOGY CLINIC | Facility: CLINIC | Age: 52
End: 2022-01-19

## 2022-01-19 PROBLEM — E11.65 TYPE 2 DIABETES MELLITUS WITH HYPERGLYCEMIA, WITHOUT LONG-TERM CURRENT USE OF INSULIN (HCC): Status: ACTIVE | Noted: 2022-01-19

## 2022-01-19 PROCEDURE — 3044F HG A1C LEVEL LT 7.0%: CPT | Performed by: INTERNAL MEDICINE

## 2022-01-21 NOTE — TELEPHONE ENCOUNTER
Spoke with patient. She has a new medicare advantage plan and has a deductible to meet. She will not be able to afford copay until next month (currently would have to pay $455.00).   She is applying for assistance through pharma but was told this will kathy

## 2022-01-21 NOTE — TELEPHONE ENCOUNTER
Yes, sounds good. Please restart Metformin ER 750mg PO daily until she is able to get Ozempic. Thanks.

## 2022-02-10 ENCOUNTER — APPOINTMENT (OUTPATIENT)
Dept: ENDOCRINOLOGY | Facility: HOSPITAL | Age: 52
End: 2022-02-10
Attending: INTERNAL MEDICINE
Payer: MEDICARE

## 2022-02-17 ENCOUNTER — APPOINTMENT (OUTPATIENT)
Dept: ENDOCRINOLOGY | Facility: HOSPITAL | Age: 52
End: 2022-02-17
Attending: INTERNAL MEDICINE
Payer: MEDICARE

## 2022-02-24 ENCOUNTER — APPOINTMENT (OUTPATIENT)
Dept: ENDOCRINOLOGY | Facility: HOSPITAL | Age: 52
End: 2022-02-24
Attending: INTERNAL MEDICINE
Payer: MEDICARE

## 2022-03-03 ENCOUNTER — APPOINTMENT (OUTPATIENT)
Dept: ENDOCRINOLOGY | Facility: HOSPITAL | Age: 52
End: 2022-03-03
Attending: INTERNAL MEDICINE
Payer: MEDICARE

## 2022-03-15 PROBLEM — E66.01 CLASS 3 OBESITY (HCC): Status: RESOLVED | Noted: 2021-01-28 | Resolved: 2022-03-15

## 2022-03-15 PROBLEM — R13.10 DYSPHAGIA: Status: RESOLVED | Noted: 2020-06-01 | Resolved: 2022-03-15

## 2022-03-15 PROBLEM — M85.89 OSTEOPENIA OF MULTIPLE SITES: Status: RESOLVED | Noted: 2017-12-20 | Resolved: 2022-03-15

## 2022-03-15 PROBLEM — R63.5 WEIGHT GAIN: Status: RESOLVED | Noted: 2017-03-08 | Resolved: 2022-03-15

## 2022-03-15 PROBLEM — E66.9 CLASS 3 OBESITY: Status: RESOLVED | Noted: 2021-01-28 | Resolved: 2022-03-15

## 2022-03-16 ENCOUNTER — HOSPITAL ENCOUNTER (OUTPATIENT)
Dept: ULTRASOUND IMAGING | Age: 52
Discharge: HOME | End: 2022-03-16
Attending: INTERNAL MEDICINE
Payer: MEDICARE

## 2022-03-16 DIAGNOSIS — E04.2 MULTINODULAR GOITER: ICD-10-CM

## 2022-03-16 PROCEDURE — 76536 US EXAM OF HEAD AND NECK: CPT | Performed by: INTERNAL MEDICINE

## 2022-03-21 ENCOUNTER — OFFICE VISIT (OUTPATIENT)
Dept: ENDOCRINOLOGY CLINIC | Facility: CLINIC | Age: 52
End: 2022-03-21
Payer: MEDICARE

## 2022-03-21 VITALS
WEIGHT: 261.63 LBS | BODY MASS INDEX: 46 KG/M2 | SYSTOLIC BLOOD PRESSURE: 119 MMHG | DIASTOLIC BLOOD PRESSURE: 72 MMHG | HEART RATE: 73 BPM

## 2022-03-21 DIAGNOSIS — E04.2 MULTINODULAR GOITER: ICD-10-CM

## 2022-03-21 DIAGNOSIS — E27.40 ADRENAL INSUFFICIENCY (HCC): ICD-10-CM

## 2022-03-21 DIAGNOSIS — R73.01 IMPAIRED FASTING GLUCOSE: Primary | ICD-10-CM

## 2022-03-21 PROCEDURE — 3074F SYST BP LT 130 MM HG: CPT | Performed by: INTERNAL MEDICINE

## 2022-03-21 PROCEDURE — 3078F DIAST BP <80 MM HG: CPT | Performed by: INTERNAL MEDICINE

## 2022-03-21 PROCEDURE — 99214 OFFICE O/P EST MOD 30 MIN: CPT | Performed by: INTERNAL MEDICINE

## 2022-03-21 RX ORDER — SPIRONOLACTONE 50 MG/1
50 TABLET, FILM COATED ORAL DAILY
Qty: 90 TABLET | Refills: 1 | Status: SHIPPED | OUTPATIENT
Start: 2022-03-21

## 2022-05-19 ENCOUNTER — PATIENT MESSAGE (OUTPATIENT)
Dept: ENDOCRINOLOGY CLINIC | Facility: CLINIC | Age: 52
End: 2022-05-19

## 2022-05-19 RX ORDER — METFORMIN HYDROCHLORIDE 750 MG/1
750 TABLET, EXTENDED RELEASE ORAL
Qty: 90 TABLET | Refills: 0 | Status: SHIPPED | OUTPATIENT
Start: 2022-05-19

## 2022-05-19 NOTE — TELEPHONE ENCOUNTER
From: Noemy King  To: Amira Lane MD  Sent: 5/19/2022 2:34 PM CDT  Subject: Metform 750mg ER    Hi,I can't afford Ozempic for another month   And my primary care doctor doesn't have any samples.  May I ask you to please get me a prescription ASAP and send to [address] [place] [address] in [facility] off of [address].   Thank you  Jaciel Dunbar  [phone] cell

## 2022-08-18 ENCOUNTER — APPOINTMENT (OUTPATIENT)
Dept: CT IMAGING | Facility: HOSPITAL | Age: 52
End: 2022-08-18
Attending: EMERGENCY MEDICINE
Payer: MEDICARE

## 2022-08-18 ENCOUNTER — HOSPITAL ENCOUNTER (EMERGENCY)
Facility: HOSPITAL | Age: 52
Discharge: HOME | End: 2022-08-18
Attending: EMERGENCY MEDICINE
Payer: MEDICARE

## 2022-08-18 VITALS
HEIGHT: 63 IN | SYSTOLIC BLOOD PRESSURE: 116 MMHG | HEART RATE: 67 BPM | DIASTOLIC BLOOD PRESSURE: 63 MMHG | RESPIRATION RATE: 18 BRPM | BODY MASS INDEX: 44.47 KG/M2 | TEMPERATURE: 97 F | WEIGHT: 251 LBS | OXYGEN SATURATION: 96 %

## 2022-08-18 DIAGNOSIS — M51.26 LUMBAR HERNIATED DISC: Primary | ICD-10-CM

## 2022-08-18 PROCEDURE — 73700 CT LOWER EXTREMITY W/O DYE: CPT | Performed by: EMERGENCY MEDICINE

## 2022-08-18 PROCEDURE — 99284 EMERGENCY DEPT VISIT MOD MDM: CPT

## 2022-08-18 PROCEDURE — 96372 THER/PROPH/DIAG INJ SC/IM: CPT

## 2022-08-18 PROCEDURE — 72131 CT LUMBAR SPINE W/O DYE: CPT | Performed by: EMERGENCY MEDICINE

## 2022-08-18 RX ORDER — PREDNISONE 20 MG/1
40 TABLET ORAL DAILY
Qty: 10 TABLET | Refills: 0 | Status: SHIPPED | OUTPATIENT
Start: 2022-08-18 | End: 2022-08-23

## 2022-08-18 RX ORDER — MORPHINE SULFATE 4 MG/ML
4 INJECTION, SOLUTION INTRAMUSCULAR; INTRAVENOUS ONCE
Status: COMPLETED | OUTPATIENT
Start: 2022-08-18 | End: 2022-08-18

## 2022-08-22 RX ORDER — METFORMIN HYDROCHLORIDE 750 MG/1
TABLET, EXTENDED RELEASE ORAL
Qty: 90 TABLET | Refills: 1 | Status: SHIPPED | OUTPATIENT
Start: 2022-08-22

## 2022-08-22 NOTE — TELEPHONE ENCOUNTER
LOV: 3/21/22    Future Appointments   Date Time Provider Karan Moyer   9/22/2022 10:45 AM Bunny Kinney MD [facility]

## 2022-12-21 ENCOUNTER — TELEPHONE (OUTPATIENT)
Dept: ENDOCRINOLOGY CLINIC | Facility: CLINIC | Age: 52
End: 2022-12-21

## 2022-12-21 NOTE — TELEPHONE ENCOUNTER
Spoke to Jose HARRINGTON at [facility] - she stated patient has been nonadherent with MTF in the past - per Jose HARRINGTON patient picked up MTF in October and seems to be taking - Jose HARRINGTON denied further questions at this time  RN advised patient has f/u with Dr. Nasim Norton on 1/21/23

## 2023-01-21 ENCOUNTER — OFFICE VISIT (OUTPATIENT)
Dept: ENDOCRINOLOGY CLINIC | Facility: CLINIC | Age: 53
End: 2023-01-21

## 2023-01-21 VITALS
DIASTOLIC BLOOD PRESSURE: 69 MMHG | SYSTOLIC BLOOD PRESSURE: 117 MMHG | BODY MASS INDEX: 46 KG/M2 | HEART RATE: 81 BPM | WEIGHT: 258 LBS

## 2023-01-21 DIAGNOSIS — E24.2 IATROGENIC CUSHING'S DISEASE (HCC): ICD-10-CM

## 2023-01-21 DIAGNOSIS — E04.1 THYROID NODULE: ICD-10-CM

## 2023-01-21 DIAGNOSIS — R73.01 IMPAIRED FASTING GLUCOSE: Primary | ICD-10-CM

## 2023-01-21 LAB
CARTRIDGE LOT#: NORMAL NUMERIC
GLUCOSE BLOOD: 146
HEMOGLOBIN A1C: 5.4 % (ref 4.3–5.6)
TEST STRIP LOT #: NORMAL NUMERIC

## 2023-01-21 PROCEDURE — 3074F SYST BP LT 130 MM HG: CPT | Performed by: INTERNAL MEDICINE

## 2023-01-21 PROCEDURE — 83036 HEMOGLOBIN GLYCOSYLATED A1C: CPT | Performed by: INTERNAL MEDICINE

## 2023-01-21 PROCEDURE — 99214 OFFICE O/P EST MOD 30 MIN: CPT | Performed by: INTERNAL MEDICINE

## 2023-01-21 PROCEDURE — 3078F DIAST BP <80 MM HG: CPT | Performed by: INTERNAL MEDICINE

## 2023-01-21 PROCEDURE — 82947 ASSAY GLUCOSE BLOOD QUANT: CPT | Performed by: INTERNAL MEDICINE

## 2023-01-21 PROCEDURE — 3044F HG A1C LEVEL LT 7.0%: CPT | Performed by: INTERNAL MEDICINE

## 2023-01-21 RX ORDER — SEMAGLUTIDE 1.34 MG/ML
0.5 INJECTION, SOLUTION SUBCUTANEOUS WEEKLY
Qty: 4.5 ML | Refills: 1 | Status: SHIPPED | OUTPATIENT
Start: 2023-01-21

## 2023-01-21 RX ORDER — ONDANSETRON 4 MG/1
4 TABLET, FILM COATED ORAL EVERY 8 HOURS PRN
Qty: 30 TABLET | Refills: 0 | Status: SHIPPED | OUTPATIENT
Start: 2023-01-21

## 2023-01-21 RX ORDER — PREDNISONE 1 MG/1
7.5 TABLET ORAL DAILY
COMMUNITY

## 2023-04-14 RX ORDER — SPIRONOLACTONE 50 MG/1
TABLET, FILM COATED ORAL
Qty: 90 TABLET | Refills: 0 | Status: SHIPPED | OUTPATIENT
Start: 2023-04-14

## 2023-04-14 NOTE — TELEPHONE ENCOUNTER
LOV; 01/21/23    RTC;6months    F/U;07/28/23     Pending Monthly Supply; order pending, approve if appropriate.

## 2023-04-16 NOTE — TELEPHONE ENCOUNTER
Pt called to speak to RN about cost of this medication. Is there any assistance that is available? Please call. 61 yo male w/PMHx of HLD presents to the ED for weakness, lightheadedness that started this morning. Wife at bedside and states he has not followed up with cardiology in more than two years. Pt is a former smoker and does not exercise regularly. Today suddenly felt dizzins upon standing and walking. Daughter is a nurse and states his HR was in the 40s. On arrival pt was also found to be bradycardic from 40-50bpmd. He is not on any regular meds. No h/o thyroid disease.         Social: drinks a few beers daily, no drug use. Former smoker  Fhx: parents/siblings have CAD    PAST MEDICAL/SURGICAL/FAMILY/SOCIAL HISTORY:    Past Medical, Past Surgical, and Family History:  PAST SURGICAL HISTORY:  S/P Hernia Surgery     S/P Tonsillectomy.    ALLERGIES AND HOME MEDICATIONS:   Allergies:        Allergies:  	No Known Allergies:

## 2023-08-17 ENCOUNTER — LAB ENCOUNTER (OUTPATIENT)
Dept: LAB | Facility: HOSPITAL | Age: 53
End: 2023-08-17
Attending: INTERNAL MEDICINE
Payer: MEDICARE

## 2023-08-17 ENCOUNTER — HOSPITAL ENCOUNTER (OUTPATIENT)
Dept: ULTRASOUND IMAGING | Facility: HOSPITAL | Age: 53
Discharge: HOME | End: 2023-08-17
Attending: INTERNAL MEDICINE
Payer: MEDICARE

## 2023-08-17 DIAGNOSIS — E04.1 THYROID NODULE: ICD-10-CM

## 2023-08-17 LAB
T4 FREE SERPL-MCNC: 1.3 NG/DL (ref 0.8–1.7)
TSI SER-ACNC: 1.75 MIU/ML (ref 0.36–3.74)

## 2023-08-17 PROCEDURE — 76536 US EXAM OF HEAD AND NECK: CPT | Performed by: INTERNAL MEDICINE

## 2023-08-17 PROCEDURE — 36415 COLL VENOUS BLD VENIPUNCTURE: CPT

## 2023-08-17 PROCEDURE — 84439 ASSAY OF FREE THYROXINE: CPT

## 2023-08-17 PROCEDURE — 84443 ASSAY THYROID STIM HORMONE: CPT

## 2023-08-17 RX ORDER — SEMAGLUTIDE 0.68 MG/ML
0.5 INJECTION, SOLUTION SUBCUTANEOUS WEEKLY
Qty: 3 ML | Refills: 0 | Status: SHIPPED | OUTPATIENT
Start: 2023-08-17

## 2023-09-18 RX ORDER — GABAPENTIN 100 MG/1
900 CAPSULE ORAL NIGHTLY
COMMUNITY

## 2023-09-21 ENCOUNTER — ANESTHESIA EVENT (OUTPATIENT)
Dept: ENDOSCOPY | Facility: HOSPITAL | Age: 53
End: 2023-09-21
Payer: MEDICARE

## 2023-09-21 ENCOUNTER — ANESTHESIA (OUTPATIENT)
Dept: ENDOSCOPY | Facility: HOSPITAL | Age: 53
End: 2023-09-21
Payer: MEDICARE

## 2023-09-21 ENCOUNTER — HOSPITAL ENCOUNTER (OUTPATIENT)
Facility: HOSPITAL | Age: 53
Setting detail: HOSPITAL OUTPATIENT SURGERY
Discharge: HOME | End: 2023-09-21
Attending: INTERNAL MEDICINE | Admitting: INTERNAL MEDICINE
Payer: MEDICARE

## 2023-09-21 VITALS
HEIGHT: 63 IN | OXYGEN SATURATION: 99 % | TEMPERATURE: 98 F | SYSTOLIC BLOOD PRESSURE: 146 MMHG | HEART RATE: 62 BPM | WEIGHT: 230 LBS | DIASTOLIC BLOOD PRESSURE: 77 MMHG | RESPIRATION RATE: 28 BRPM | BODY MASS INDEX: 40.75 KG/M2

## 2023-09-21 DIAGNOSIS — D72.18 EOSINOPHILIC GRANULOMATOSIS WITH POLYANGIITIS (EGPA): ICD-10-CM

## 2023-09-21 DIAGNOSIS — R19.4 CHANGE IN BOWEL HABITS: ICD-10-CM

## 2023-09-21 DIAGNOSIS — Z86.010 PERSONAL HISTORY OF COLONIC POLYPS: ICD-10-CM

## 2023-09-21 DIAGNOSIS — R13.19 ESOPHAGEAL DYSPHAGIA: ICD-10-CM

## 2023-09-21 DIAGNOSIS — R14.0 BLOATING: ICD-10-CM

## 2023-09-21 DIAGNOSIS — R11.0 NAUSEA: ICD-10-CM

## 2023-09-21 DIAGNOSIS — R10.84 GENERALIZED ABDOMINAL PAIN: ICD-10-CM

## 2023-09-21 DIAGNOSIS — M30.1 EOSINOPHILIC GRANULOMATOSIS WITH POLYANGIITIS (EGPA): ICD-10-CM

## 2023-09-21 LAB — GLUCOSE BLDC GLUCOMTR-MCNC: 86 MG/DL (ref 70–99)

## 2023-09-21 PROCEDURE — 0DB28ZX EXCISION OF MIDDLE ESOPHAGUS, VIA NATURAL OR ARTIFICIAL OPENING ENDOSCOPIC, DIAGNOSTIC: ICD-10-PCS | Performed by: INTERNAL MEDICINE

## 2023-09-21 PROCEDURE — 0DB38ZX EXCISION OF LOWER ESOPHAGUS, VIA NATURAL OR ARTIFICIAL OPENING ENDOSCOPIC, DIAGNOSTIC: ICD-10-PCS | Performed by: INTERNAL MEDICINE

## 2023-09-21 PROCEDURE — 0DB98ZX EXCISION OF DUODENUM, VIA NATURAL OR ARTIFICIAL OPENING ENDOSCOPIC, DIAGNOSTIC: ICD-10-PCS | Performed by: INTERNAL MEDICINE

## 2023-09-21 PROCEDURE — 88312 SPECIAL STAINS GROUP 1: CPT | Performed by: INTERNAL MEDICINE

## 2023-09-21 PROCEDURE — 82962 GLUCOSE BLOOD TEST: CPT

## 2023-09-21 PROCEDURE — 0DB68ZX EXCISION OF STOMACH, VIA NATURAL OR ARTIFICIAL OPENING ENDOSCOPIC, DIAGNOSTIC: ICD-10-PCS | Performed by: INTERNAL MEDICINE

## 2023-09-21 PROCEDURE — 88305 TISSUE EXAM BY PATHOLOGIST: CPT | Performed by: INTERNAL MEDICINE

## 2023-09-21 PROCEDURE — 0DBE8ZX EXCISION OF LARGE INTESTINE, VIA NATURAL OR ARTIFICIAL OPENING ENDOSCOPIC, DIAGNOSTIC: ICD-10-PCS | Performed by: INTERNAL MEDICINE

## 2023-09-21 PROCEDURE — 0DBM8ZX EXCISION OF DESCENDING COLON, VIA NATURAL OR ARTIFICIAL OPENING ENDOSCOPIC, DIAGNOSTIC: ICD-10-PCS | Performed by: INTERNAL MEDICINE

## 2023-09-21 PROCEDURE — 0DB78ZX EXCISION OF STOMACH, PYLORUS, VIA NATURAL OR ARTIFICIAL OPENING ENDOSCOPIC, DIAGNOSTIC: ICD-10-PCS | Performed by: INTERNAL MEDICINE

## 2023-09-21 RX ORDER — SODIUM CHLORIDE, SODIUM LACTATE, POTASSIUM CHLORIDE, CALCIUM CHLORIDE 600; 310; 30; 20 MG/100ML; MG/100ML; MG/100ML; MG/100ML
INJECTION, SOLUTION INTRAVENOUS CONTINUOUS
Status: DISCONTINUED | OUTPATIENT
Start: 2023-09-21 | End: 2023-09-21

## 2023-09-21 RX ORDER — SODIUM CHLORIDE, SODIUM LACTATE, POTASSIUM CHLORIDE, CALCIUM CHLORIDE 600; 310; 30; 20 MG/100ML; MG/100ML; MG/100ML; MG/100ML
INJECTION, SOLUTION INTRAVENOUS CONTINUOUS PRN
Status: DISCONTINUED | OUTPATIENT
Start: 2023-09-21 | End: 2023-09-21 | Stop reason: SURG

## 2023-09-21 RX ORDER — MIDAZOLAM HYDROCHLORIDE 1 MG/ML
1 INJECTION INTRAMUSCULAR; INTRAVENOUS EVERY 5 MIN PRN
Status: DISCONTINUED | OUTPATIENT
Start: 2023-09-21 | End: 2023-09-21

## 2023-09-21 RX ORDER — SODIUM CHLORIDE 0.9 % (FLUSH) 0.9 %
10 SYRINGE (ML) INJECTION AS NEEDED
Status: DISCONTINUED | OUTPATIENT
Start: 2023-09-21 | End: 2023-09-21

## 2023-09-21 RX ORDER — NALOXONE HYDROCHLORIDE 0.4 MG/ML
80 INJECTION, SOLUTION INTRAMUSCULAR; INTRAVENOUS; SUBCUTANEOUS AS NEEDED
Status: DISCONTINUED | OUTPATIENT
Start: 2023-09-21 | End: 2023-09-21

## 2023-09-21 RX ORDER — LIDOCAINE HYDROCHLORIDE 10 MG/ML
INJECTION, SOLUTION EPIDURAL; INFILTRATION; INTRACAUDAL; PERINEURAL AS NEEDED
Status: DISCONTINUED | OUTPATIENT
Start: 2023-09-21 | End: 2023-09-21 | Stop reason: SURG

## 2023-09-21 RX ADMIN — SODIUM CHLORIDE, SODIUM LACTATE, POTASSIUM CHLORIDE, CALCIUM CHLORIDE: 600; 310; 30; 20 INJECTION, SOLUTION INTRAVENOUS at 13:39:00

## 2023-09-21 RX ADMIN — LIDOCAINE HYDROCHLORIDE 25 MG: 10 INJECTION, SOLUTION EPIDURAL; INFILTRATION; INTRACAUDAL; PERINEURAL at 13:17:00

## 2023-09-21 RX ADMIN — SODIUM CHLORIDE, SODIUM LACTATE, POTASSIUM CHLORIDE, CALCIUM CHLORIDE: 600; 310; 30; 20 INJECTION, SOLUTION INTRAVENOUS at 13:13:00

## 2023-09-21 NOTE — OPERATIVE REPORT
COLONOSCOPY AND ESOPHAGOGASTRODUODENOSCOPY REPORT    Patient Name:  Nikita Miller Record #: [id number]  YOB: 1970  Date of Procedure: 9/21/2023    Referring physician: Kandice Yeboah MD    Surgeon:  Daniella Russo. 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  Abdominal bloating, generalized abdominal discomfort and nausea with episodic periods of diarrhea and occasional episodes of dysphagia. Personal history of adenomatous polyps 7-8 years ago. Post-op diagnosis: Colon polyps, gastric polyps    Medications given:  MAC    Procedure #1:    After informed consent, discussion of risks and alternatives the patient was placed in the left lateral decubitus position and the high definition colonoscope was introduced into the rectum and advanced under direct visualization to the ileum. Bowel preparation was excellent. The mucosa was carefully inspected upon withdrawal of the scope. Withdrawal time was 8 minutes. ASA class was 2. Findings: The ileal mucosa was normal. . The visualized colonic mucosa was normal with the exception of the findings below. 2 small descending colon polyps were cold snare excised. Random colon biopsies were obtained. There was no evidence of ulcerations, colitis, vascular anomalies, diverticulosis or mass lesions. Retroflexed view of the anus revealed moderate sized internal hemorrhoids. Digital rectal exam was normal.    Procedure #2:   With the patient still in the left lateral decubitus position the gastroscope was inserted into the oropharynx and advanced under direct visualization to the second portion of the duodenum. Upon withdrawal of the scope, a retroflexed maneuver was performed to visualize the gastric angulus and cardia. Findings: The GE junction was at 38 cm.   The esophagus was normal in appearance without evidence of esophagitis, varices, Gallo's epithelium, stricture or hiatal hernia. Mid and distal esophageal biopsies were then obtained. The stomach demonstrated normal distensibility. There were no retained gastric contents and no outlet obstruction. There was no evidence of gastritis, ulcers or erosions. There were no varices or vascular anomalies. Gastric polyps were biopsied. Gastric biopsies were obtained. The duodenum was normal with no erosions and with a normal fold pattern by high definition endoscopy. Duodenal biopsies were obtained.     Plan:   High fiber diet  Await biopsy results    Specimens: colon, esophagus, stomach and duodenum  EBL: minimal    Aronchick bowel prep scale:  5 Inadequate (repeat preparation needed)  4 Poor (semisolid stool could not be suctioned and <90% of mucosa seen)  3 Fair (semisolid stool could not be suctioned, but >90% of mucosa seen)  2 Good (clear liquid covering up to 25% of mucosa, but >90% of mucosa seen)  1 Excellent (>95% of mucosa seen)

## 2023-09-21 NOTE — DISCHARGE INSTRUCTIONS
Home Care Instructions for Colonoscopy and Gastroscopy with Sedation    Diet:  - Resume your regular diet as tolerated unless otherwise instructed. - Start with light meals to minimize bloating.  - Do not drink alcohol today. Medication:  - If you have questions about resuming your normal medications, please contact your Primary Care Physician. Activities:  - Take it easy today. Do not return to work today. - Do not drive today. - Do not operate any machinery today (including kitchen equipment). Colonoscopy:  - You may notice some rectal \"spotting\" (a little blood on the toilet tissue) for a day or two after the exam. This is normal.  - If you experience any rectal bleeding (not spotting), persistent tenderness or sharp severe abdominal pains, oral temperature over 100 degrees Fahrenheit, light-headedness or dizziness, or any other problems, contact your doctor. Gastroscopy:  - You may have a sore throat for 2-3 days following the exam. This is normal. Gargling with warm salt water (1/2 tsp salt to 1 glass warm water) or using throat lozenges will help. - If you experience any sharp pain in your neck, abdomen or chest, vomiting of blood, oral temperature over 100 degrees Fahrenheit, light-headedness or dizziness, or any other problems, contact your doctor. **If unable to reach your doctor, please go to the [facility] Emergency Room**    - Your referring physician will receive a full report of your examination.  - If you do not hear from your doctor's office within two weeks of your biopsy, please call them for your results. You may be able to see your laboratory results in [facility] between 4 and 7 business days. In some cases, your physician may not have viewed the results before they are released to [address] If you have questions regarding your results contact the physician who ordered the test/exam by phone or via [address] by choosing \"Ask a Medical Question. \"

## 2023-10-19 RX ORDER — SPIRONOLACTONE 50 MG/1
50 TABLET, FILM COATED ORAL DAILY
Qty: 90 TABLET | Refills: 0 | Status: SHIPPED | OUTPATIENT
Start: 2023-10-19

## 2024-03-08 ENCOUNTER — HOSPITAL ENCOUNTER (EMERGENCY)
Facility: HOSPITAL | Age: 54
Discharge: HOME | End: 2024-03-08
Attending: EMERGENCY MEDICINE
Payer: MEDICARE

## 2024-03-08 ENCOUNTER — APPOINTMENT (OUTPATIENT)
Dept: GENERAL RADIOLOGY | Facility: HOSPITAL | Age: 54
End: 2024-03-08
Attending: EMERGENCY MEDICINE
Payer: MEDICARE

## 2024-03-08 VITALS
DIASTOLIC BLOOD PRESSURE: 48 MMHG | SYSTOLIC BLOOD PRESSURE: 119 MMHG | HEART RATE: 61 BPM | WEIGHT: 241.38 LBS | BODY MASS INDEX: 43 KG/M2 | OXYGEN SATURATION: 97 % | TEMPERATURE: 98 F | RESPIRATION RATE: 10 BRPM

## 2024-03-08 DIAGNOSIS — R07.89 CHEST PAIN, ATYPICAL: Primary | ICD-10-CM

## 2024-03-08 LAB
ALBUMIN SERPL-MCNC: 4.2 G/DL (ref 3.2–4.8)
ALBUMIN/GLOB SERPL: 1.4 {RATIO} (ref 1–2)
ALP LIVER SERPL-CCNC: 181 U/L
ALT SERPL-CCNC: 51 U/L
ANION GAP SERPL CALC-SCNC: 5 MMOL/L (ref 0–18)
AST SERPL-CCNC: 38 U/L (ref ?–34)
BASOPHILS # BLD AUTO: 0.04 X10(3) UL (ref 0–0.2)
BASOPHILS NFR BLD AUTO: 0.4 %
BILIRUB SERPL-MCNC: 0.6 MG/DL (ref 0.3–1.2)
BILIRUB UR QL: NEGATIVE
BUN BLD-MCNC: 9 MG/DL (ref 9–23)
BUN/CREAT SERPL: 11.8 (ref 10–20)
CALCIUM BLD-MCNC: 9.6 MG/DL (ref 8.7–10.4)
CHLORIDE SERPL-SCNC: 106 MMOL/L (ref 98–112)
CLARITY UR: CLEAR
CO2 SERPL-SCNC: 27 MMOL/L (ref 21–32)
CREAT BLD-MCNC: 0.76 MG/DL
DEPRECATED RDW RBC AUTO: 40 FL (ref 35.1–46.3)
EGFRCR SERPLBLD CKD-EPI 2021: 95 ML/MIN/1.73M2 (ref 60–?)
EOSINOPHIL # BLD AUTO: 0.07 X10(3) UL (ref 0–0.7)
EOSINOPHIL NFR BLD AUTO: 0.7 %
ERYTHROCYTE [DISTWIDTH] IN BLOOD BY AUTOMATED COUNT: 13.2 % (ref 11–15)
GLOBULIN PLAS-MCNC: 2.9 G/DL (ref 2.8–4.4)
GLUCOSE BLD-MCNC: 95 MG/DL (ref 70–99)
GLUCOSE UR-MCNC: NORMAL MG/DL
HCT VFR BLD AUTO: 37.7 %
HGB BLD-MCNC: 12.9 G/DL
HGB UR QL STRIP.AUTO: NEGATIVE
IMM GRANULOCYTES # BLD AUTO: 0.06 X10(3) UL (ref 0–1)
IMM GRANULOCYTES NFR BLD: 0.6 %
KETONES UR-MCNC: NEGATIVE MG/DL
LEUKOCYTE ESTERASE UR QL STRIP.AUTO: NEGATIVE
LYMPHOCYTES # BLD AUTO: 1.61 X10(3) UL (ref 1–4)
LYMPHOCYTES NFR BLD AUTO: 16.7 %
MAGNESIUM SERPL-MCNC: 1.8 MG/DL (ref 1.6–2.6)
MCH RBC QN AUTO: 28.7 PG (ref 26–34)
MCHC RBC AUTO-ENTMCNC: 34.2 G/DL (ref 31–37)
MCV RBC AUTO: 83.8 FL
MONOCYTES # BLD AUTO: 0.87 X10(3) UL (ref 0.1–1)
MONOCYTES NFR BLD AUTO: 9 %
NEUTROPHILS # BLD AUTO: 6.98 X10 (3) UL (ref 1.5–7.7)
NEUTROPHILS # BLD AUTO: 6.98 X10(3) UL (ref 1.5–7.7)
NEUTROPHILS NFR BLD AUTO: 72.6 %
NITRITE UR QL STRIP.AUTO: NEGATIVE
OSMOLALITY SERPL CALC.SUM OF ELEC: 284 MOSM/KG (ref 275–295)
PH UR: 6 [PH] (ref 5–8)
PLATELET # BLD AUTO: 347 10(3)UL (ref 150–450)
POTASSIUM SERPL-SCNC: 3.9 MMOL/L (ref 3.5–5.1)
PROT SERPL-MCNC: 7.1 G/DL (ref 5.7–8.2)
PROT UR-MCNC: NEGATIVE MG/DL
RBC # BLD AUTO: 4.5 X10(6)UL
SODIUM SERPL-SCNC: 138 MMOL/L (ref 136–145)
SP GR UR STRIP: 1.01 (ref 1–1.03)
TROPONIN I SERPL HS-MCNC: <3 NG/L
UROBILINOGEN UR STRIP-ACNC: NORMAL
WBC # BLD AUTO: 9.6 X10(3) UL (ref 4–11)

## 2024-03-08 PROCEDURE — 96361 HYDRATE IV INFUSION ADD-ON: CPT

## 2024-03-08 PROCEDURE — 99284 EMERGENCY DEPT VISIT MOD MDM: CPT

## 2024-03-08 PROCEDURE — 93005 ELECTROCARDIOGRAM TRACING: CPT

## 2024-03-08 PROCEDURE — 80053 COMPREHEN METABOLIC PANEL: CPT | Performed by: EMERGENCY MEDICINE

## 2024-03-08 PROCEDURE — 71045 X-RAY EXAM CHEST 1 VIEW: CPT | Performed by: EMERGENCY MEDICINE

## 2024-03-08 PROCEDURE — 84484 ASSAY OF TROPONIN QUANT: CPT | Performed by: EMERGENCY MEDICINE

## 2024-03-08 PROCEDURE — 93010 ELECTROCARDIOGRAM REPORT: CPT

## 2024-03-08 PROCEDURE — 81003 URINALYSIS AUTO W/O SCOPE: CPT | Performed by: EMERGENCY MEDICINE

## 2024-03-08 PROCEDURE — 83735 ASSAY OF MAGNESIUM: CPT | Performed by: EMERGENCY MEDICINE

## 2024-03-08 PROCEDURE — 85025 COMPLETE CBC W/AUTO DIFF WBC: CPT | Performed by: EMERGENCY MEDICINE

## 2024-03-08 PROCEDURE — 99285 EMERGENCY DEPT VISIT HI MDM: CPT

## 2024-03-08 PROCEDURE — 96374 THER/PROPH/DIAG INJ IV PUSH: CPT

## 2024-03-08 RX ORDER — KETOROLAC TROMETHAMINE 10 MG/1
10 TABLET, FILM COATED ORAL EVERY 6 HOURS PRN
Qty: 20 TABLET | Refills: 0 | Status: SHIPPED | OUTPATIENT
Start: 2024-03-08 | End: 2024-03-15

## 2024-03-08 RX ORDER — KETOROLAC TROMETHAMINE 15 MG/ML
15 INJECTION, SOLUTION INTRAMUSCULAR; INTRAVENOUS ONCE
Status: COMPLETED | OUTPATIENT
Start: 2024-03-08 | End: 2024-03-08

## 2024-03-08 RX ORDER — CYCLOBENZAPRINE HCL 10 MG
10 TABLET ORAL 3 TIMES DAILY PRN
Qty: 20 TABLET | Refills: 0 | Status: SHIPPED | OUTPATIENT
Start: 2024-03-08 | End: 2024-03-15

## 2024-03-09 LAB
ATRIAL RATE: 61 BPM
P AXIS: 38 DEGREES
P-R INTERVAL: 162 MS
Q-T INTERVAL: 378 MS
QRS DURATION: 68 MS
QTC CALCULATION (BEZET): 380 MS
R AXIS: 11 DEGREES
T AXIS: 32 DEGREES
VENTRICULAR RATE: 61 BPM

## 2024-03-09 NOTE — ED PROVIDER NOTES
Patient Seen in: [facility] Emergency Department    History     Chief Complaint   Patient presents with    Fatigue     With left shoulder pain       HPI    History is provided by patient/independent historian: patient  53 year old female with history of asthma, depression, hypertension, here with complaints of generalized weakness for the last 2 months, diarrhea yesterday, now improved today, and left arm pain and chest pain.  Her doctor had ordered an outpatient echocardiogram, but she has not yet made that appointment.  She has had a stress test in the remote past that was supposedly normal.  She is non-smoker.  No fevers, shortness of breath.  She was concerned for possible urinary tract infection.    History reviewed.   Past Medical History:   Diagnosis Date    ALLERGIC RHINITIS     Anesthesia complication     Wakes up    Anxiety     Arrhythmia     Arthralgia     ASTHMA     Asthma (HCC)     Bronchitis     DEPRESSION     Depression     Disorder of liver     Disorder of thyroid     NODULES    GERD     eosinophilic gastroenteritis    High blood pressure     History of adverse reaction to anesthesia     HYPERTENSION     Migraines     Moderate persistent asthma with exacerbation (HCC) 02/28/2020    Multiple fractures of ribs of left side 03/23/2012    Formatting of this note might be different from the original. T 7  T 9    OBESITY     OSTEOARTHRITIS     Osteoarthritis     Osteopenia of multiple sites 12/20/2017    OSTEOPOROSIS     Osteoporosis     OTHER DISEASES     Churg Salomon Sydrome    OTHER DISEASES     early cataracts    OTHER DISEASES     pleurisy/pericarditis 2006    OTHER DISEASES     secondary Cushing's Disease    Pleuritis     Pneumonia due to organism     Pulmonary emphysema (HCC)     Sleep apnea     NO MACHINE    Vasculitic neuropathy (HCC)     Vasculitis (HCC)     Vitamin deficiency 12/26/2009         History reviewed.   Past Surgical History:   Procedure Laterality Date    ANKLE FRACTURE  SURGERY Right     APPENDECTOMY      1994    CHOLECYSTECTOMY  11/26/2013    COLONOSCOPY  09/2023    COLONOSCOPY N/A 9/21/2023    Procedure: COLONOSCOPY;  Surgeon: Cristian Narayan MD;  Location: [facility] ENDOSCOPY    EGD  09/2023    FRACTURE SURGERY      left broken finger, right ankle, left elbow    OTHER SURGICAL HISTORY      fractures of L arm 2002, pin removal 2003    OTHER SURGICAL HISTORY      pleural biopsy         Home Medications reviewed :  (Not in a hospital admission)        History reviewed.   Social History     Socioeconomic History    Marital status: Single   Tobacco Use    Smoking status: Never    Smokeless tobacco: Never   Vaping Use    Vaping Use: Never used   Substance and Sexual Activity    Alcohol use: No    Drug use: No         ROS  Review of Systems   Constitutional:  Positive for fatigue.   Respiratory:  Negative for shortness of breath.    Cardiovascular:  Positive for chest pain.   Musculoskeletal:  Positive for arthralgias.   All other systems reviewed and are negative.     All other pertinent organ systems are reviewed and are negative.      Physical Exam     ED Triage Vitals [03/08/24 2020]   /82   Pulse 70   Resp 19   Temp 98 °F (36.7 °C)   Temp src Oral   SpO2 97 %   O2 Device None (Room air)     Vital signs reviewed.      Physical Exam  Vitals and nursing note reviewed.   Cardiovascular:      Pulses: Normal pulses.   Pulmonary:      Effort: No respiratory distress.   Abdominal:      General: There is no distension.   Musculoskeletal:      Comments: Left shoulder nontender to palpation, full range of motion passively   Neurological:      Mental Status: She is alert.         ED Course       Labs:     Labs Reviewed   COMP METABOLIC PANEL (14) - Abnormal; Notable for the following components:       Result Value    ALT 51 (*)     AST 38 (*)     Alkaline Phosphatase 181 (*)     All other components within normal limits   MAGNESIUM - Normal   TROPONIN I HIGH SENSITIVITY - Normal   CBC WITH  DIFFERENTIAL WITH PLATELET    Narrative:     The following orders were created for panel order CBC With Differential With Platelet.                  Procedure                               Abnormality         Status                                     ---------                               -----------         ------                                     CBC W/ DIFFERENTIAL[[id number]]                              Final result                                                 Please view results for these tests on the individual orders.   URINALYSIS, ROUTINE   CBC W/ DIFFERENTIAL         My EKG Interpretation: EKG    Rate, intervals and axes as noted on EKG Report.  Rate: 61  Rhythm: Sinus Rhythm  Reading: normal for rate, normal for rhythm, no acute ST changes           As reviewed and Interpreted by me      Imaging Results Available and Reviewed while in ED:   XR CHEST AP PORTABLE  (CPT=71045)    Result Date: 3/8/2024  CONCLUSION:   No acute cardiopulmonary abnormality.  Subpleural density within the left lower lobe is indeterminate may be sequela of pleural thickening or scar. Non-emergent chest CT recommended to further evaluate and to exclude underlying pulmonary nodule.    Dictated by (CST): Marko Limon MD on 3/08/2024 at 10:07 PM     Finalized by (CST): Marko Limon MD on 3/08/2024 at 10:08 PM         My review and independent interpretation of XR images: no infiltrate. Radiology report corroborates this in addition to other details as reported by them.      Decision rules/scores evaluated:   HEART Score for cardiac disease  H.E.A.R.T Score  ===================================  History:  + 2 Highly Suspicious  +1 Moderately Suspicious  +0 Slightly Suspicious      EKG:   +2 ST depression  +1 Non-specific   +0 Normal    53 year old  + 2   65 and over  +1  45-64  +0  44 and under    Risk factors     +2 for 3 or more  +1 for 1-2  +0 for 0  Hypercholesterolemia  Hypertension  Diabetes Mellitus  Cigarette  smoking  Positive family history  Obesity    Troponin  +2 3x upper limit or more  +1 1-3x upper limit  +0  Less than or equal to upper limit  =====================================    0-3: 2.5% risk of adverse cardiac event.  4-6: 20.3% risk of adverse cardiac event, suggesting admission to the hospital  ?7: 72.7% risk of adverse cardiac event, suggesting early invasive measures        Diagnostic labs/tests considered but not ordered: none    ED Medications Administered:   Medications   sodium chloride 0.9 % IV bolus 1,000 mL (1,000 mL Intravenous New Bag 3/8/24 2107)   ketorolac (Toradol) 15 MG/ML injection 15 mg (15 mg Intravenous Given 3/8/24 2120)            - pt feeling improved - pt to f/u with PCP for outpt echo    MDM       Medical Decision Making      Differential Diagnosis: After obtaining the patient's history, performing the physical exam and reviewing the diagnostics, multiple initial diagnoses were considered based on the presenting problem including ACS, anxiety, chest wall pain, pericarditis, UTI    External document review: I personally reviewed available external medical records for any recent pertinent discharge summaries, testing, and procedures - the findings are as follows: 2/22/24 visit with Dr. Grewal for medicare annual wellness visit, 1/28/21 echo normal ef and normal diastolic dysfunction    Complicating Factors: The patient already  has a past medical history of ALLERGIC RHINITIS, Anesthesia complication, Anxiety, Arrhythmia, Arthralgia, ASTHMA, Asthma (HCC), Bronchitis, DEPRESSION, Depression, Disorder of liver, Disorder of thyroid, GERD, High blood pressure, History of adverse reaction to anesthesia, HYPERTENSION, Migraines, Moderate persistent asthma with exacerbation (HCC) (02/28/2020), Multiple fractures of ribs of left side (03/23/2012), OBESITY, OSTEOARTHRITIS, Osteoarthritis, Osteopenia of multiple sites (12/20/2017), OSTEOPOROSIS, Osteoporosis, OTHER DISEASES, OTHER DISEASES,  OTHER DISEASES, OTHER DISEASES, Pleuritis, Pneumonia due to organism, Pulmonary emphysema (HCC), Sleep apnea, Vasculitic neuropathy (HCC), Vasculitis (HCC), and Vitamin deficiency (12/26/2009). to contribute to the complexity of this ED evaluation.    Procedures performed: none    Discussed management with physician/appropriate source: none    Considered admission/deescalation of care for: none    Social determinants of health affecting patient care: none    Prescription medications considered: flexeril, toradol, discussed continuing current medication regimen    The patient requires continuous monitoring for: chest pain, weakness    Shared decision making: discussed possible admission          Disposition and Plan     Clinical Impression:  1. Chest pain, atypical        Disposition:  Discharge    Follow-up:  Danial Grewal MD  [address] [facility]  [address]  [place] [address]  [phone]    Follow up        Medications Prescribed:  Current Discharge Medication List        START taking these medications    Details   cyclobenzaprine 10 MG Oral Tab Take 1 tablet (10 mg total) by mouth 3 (three) times daily as needed for Muscle spasms.  Qty: 20 tablet, Refills: 0      Ketorolac Tromethamine 10 MG Oral Tab Take 1 tablet (10 mg total) by mouth every 6 (six) hours as needed.  Qty: 20 tablet, Refills: 0

## (undated) DEVICE — KIT ENDO ORCAPOD 160/180/190

## (undated) DEVICE — DRAPE SRG 70X60IN SPLT U IMPRV

## (undated) DEVICE — SOL H2O 1000ML BTL

## (undated) DEVICE — WIRE K SMALL .035: Type: IMPLANTABLE DEVICE | Status: NON-FUNCTIONAL

## (undated) DEVICE — YANKAUER SUCTION INSTRUMENT NO CONTROL VENT, BULB TIP, CLEAR: Brand: YANKAUER

## (undated) DEVICE — SPLINT PRECUT SYNTH 4X30

## (undated) DEVICE — STERILE LATEX POWDER-FREE SURGICAL GLOVESWITH NITRILE COATING: Brand: PROTEXIS

## (undated) DEVICE — KENDALL SCD EXPRESS SLEEVES, KNEE LENGTH, LARGE: Brand: KENDALL SCD

## (undated) DEVICE — SNARE OPTMZ PLPCTM TRP

## (undated) DEVICE — 3M™ COBAN™ NL STERILE NON-LATEX SELF-ADHERENT WRAP, 2084S, 4 IN X 5 YD (10 CM X 4,5 M), 18 ROLLS/CASE: Brand: 3M™ COBAN™

## (undated) DEVICE — CHLORAPREP 26ML APPLICATOR

## (undated) DEVICE — REPROCESS TOURN 60-7070-105

## (undated) DEVICE — SPLINT PRECUT ORTHOGLASS 3X12

## (undated) DEVICE — PADDING CAST WYTEX 2\" STER

## (undated) DEVICE — MEDI-VAC NON-CONDUCTIVE SUCTION TUBING 6MM X 1.8M (6FT.) L: Brand: CARDINAL HEALTH

## (undated) DEVICE — BANDAGE WEBRIL 4 INCH

## (undated) DEVICE — SNARE ENDOSCOPIC 10MM ROUND

## (undated) DEVICE — DRAPE MN CARM

## (undated) DEVICE — ZIMMER® STERILE DISPOSABLE TOURNIQUET CUFF WITH PLC, DUAL PORT, SINGLE BLADDER, 34 IN. (86 CM)

## (undated) DEVICE — STERILE POLYISOPRENE POWDER-FREE SURGICAL GLOVES: Brand: PROTEXIS

## (undated) DEVICE — CONMED SCOPE SAVER BITE BLOCK, 20X27 MM: Brand: SCOPE SAVER

## (undated) DEVICE — BIPOLAR FORCEPS CORD,BANANA LEADS: Brand: VALLEYLAB

## (undated) DEVICE — Device: Brand: DUAL NARE NASAL CANNULAE FEMALE LUER CON 7FT O2 TUBE

## (undated) DEVICE — KIT CLEAN ENDOKIT 1.1OZ GOWNX2

## (undated) DEVICE — FORCEP RADIAL JAW 4

## (undated) DEVICE — UPPER EXTREMITY: Brand: MEDLINE INDUSTRIES, INC.

## (undated) DEVICE — MEDI-VAC NON-CONDUCTIVE SUCTION TUBING: Brand: CARDINAL HEALTH

## (undated) DEVICE — 60 ML SYRINGE REGULAR TIP: Brand: MONOJECT

## (undated) NOTE — ED AVS SNAPSHOT
[facility] Emergency Department    [address]     [place] [address]    Phone:  [phone]    Fax:  [phone]           Nile Lara   MRN: [id number]    Department:  [facility] Emergency Department   Date of Visit:  1/1 If you have difficulty scheduling your follow-up appointment as directed, please call our  at [phone]. Si tiene problemas para programar scott lesley de seguimiento según lo indicado, llame al encargado de sophie al [phone].     It i continue to take your medications as instructed by your Primary Care doctor until you can check with your doctor. Please bring the medication list to your next doctor's appointment.     Any imaging studies and labs completed today can be reviewed in your M Visit Codeship  You can access your MyChart to more actively manage your health care and view more details from this visit by going to [email or web address].   If you've recently had a stay at the Hospital you can access your discharge instructions i

## (undated) NOTE — IP AVS SNAPSHOT
[address]  [address] [facility] [phone]                Discharge Summary   1/27/2017    Sabra Cowden           Admission Information        Provider Department    1/27/2017 Nehal Juarez MD Next dose due: Tomorrow morning        2 sprays by Each Nare route once daily.     Gamal See taking these medications        Instructions Authorizing Provider    Morning Afternoon Evening As Needed    Albuterol Adkins Take 5 mL by mouth 4 (four) times daily as needed. predniSONE 10 MG Tabs   Last time this was given:  20 mg on 1/28/2017 11:42 AM   Commonly known as:  Holden Dowd   Next dose due: Tomorrow morning        20 mg daily. Rolando Martínez   [phone]              Immunization History as of 1/28/2017  Never Reviewed    INFLUENZA 10/24/2016, 10/14/2014, 11/15/2013, 12/21/2012, 10/24/2010, 11/23/2009    Influenza Virus Vaccine, H1N1 11/23/2009 9.0 (12/02/16)  85 (12/02/16)  13 (L)      Metabolic Lab Results  (Last result in the past 90 days)    ALT Bilirubin,Total Total Protein Albumin Sodium Potassium Chloride    (12/02/16)  26 (12/02/16)  0.61 (12/02/16)  6.8 (12/02/16)  3.5 (01/28/17)  141 (0 Call [phone] for help. [employer] is NOT to be used for urgent needs.   For medical emergencies, dial 911.             _____________________________________________________________________________    Medication Side Effects - Medications to be taken at Aerosol    Albuterol Sulfate HFA (PROAIR HFA) 108 (90 BASE) MCG/ACT Inhalation Aero Soln    Fluticasone Propionate 50 MCG/ACT Nasal Suspension    Levalbuterol HCl (XOPENEX) 0.63 MG/3ML Inhalation Nebu Soln       Use:  Treatment of asthma, COPD/emphysema, c

## (undated) NOTE — ED AVS SNAPSHOT
Abigail Armendariz   MRN: [id number]    Department:  [facility] Emergency Department   Date of Visit:  4/7/2019           Disclosure     Insurance plans vary and the physician(s) referred by the ER may not be covered by your plan.  Please contact within the next three months to obtain basic health screening including reassessment of your blood pressure.     IF THERE IS ANY CHANGE OR WORSENING OF YOUR CONDITION, CALL YOUR PRIMARY CARE PHYSICIAN AT ONCE OR RETURN IMMEDIATELY TO THE EMERGENCY DEPARTMEN

## (undated) NOTE — LETTER
[address]  Authorization for Surgical Operation and Procedure                                                                                           I hereby authorize Mychal Stevens MD, my physician and his/her assistants (if applicable), which may include medical students, residents, and/or fellows, to perform the following surgical operation/ procedure and administer such anesthesia as may be determined necessary by my physician: Operation/Procedure name (s) COLONOSCOPY /ESOPHAGOGASTRODUODENOSCOPY with dilation on [address] [facility]   2. I recognize that during the surgical operation/procedure, unforeseen conditions may necessitate additional or different procedures than those listed above. I, therefore, further authorize and request that the above-named surgeon, assistants, or designees perform such procedures as are, in their judgment, necessary and desirable. 3.   My surgeon/physician has discussed prior to my surgery the potential benefits, risks and side effects of this procedure; the likelihood of achieving goals; and potential problems that might occur during recuperation. They also discussed reasonable alternatives to the procedure, including risks, benefits, and side effects related to the alternatives and risks related to not receiving this procedure. I have had all my questions answered and I acknowledge that no guarantee has been made as to the result that may be obtained. 4.   Should the need arise during my operation/procedure, which includes change of level of care prior to discharge, I also consent to the administration of blood and/or blood products. Further, I understand that despite careful testing and screening of blood or blood products by collecting agencies, I may still be subject to ill effects as a result of receiving a blood transfusion and/or blood products.   The following are some, but not all, of the potential risks that can occur: fever and allergic reactions, hemolytic reactions, transmission of diseases such as Hepatitis, AIDS and Cytomegalovirus (CMV) and fluid overload. In the event that I wish to have an autologous transfusion of my own blood, or a directed donor transfusion, I will discuss this with my physician. Check only if Refusing Blood or Blood Products  I understand refusal of blood or blood products as deemed necessary by my physician may have serious consequences to my condition to include possible death. I hereby assume responsibility for my refusal and release the hospital, its personnel, and my physicians from any responsibility for the consequences of my refusal.    o  Refuse   5. I authorize the use of any specimen, organs, tissues, body parts or foreign objects that may be removed from my body during the operation/procedure for diagnosis, research or teaching purposes and their subsequent disposal by hospital authorities. I also authorize the release of specimen test results and/or written reports to my treating physician on the hospital medical staff or other referring or consulting physicians involved in my care, at the discretion of the Pathologist or my treating physician. 6.   I consent to the photographing or videotaping of the operations or procedures to be performed, including appropriate portions of my body for medical, scientific, or educational purposes, provided my identity is not revealed by the pictures or by descriptive texts accompanying them. If the procedure has been photographed/videotaped, the surgeon will obtain the original picture, image, videotape or CD. The hospital will not be responsible for storage, release or maintenance of the picture, image, tape or CD.    7.   I consent to the presence of a  or observers in the operating room as deemed necessary by my physician or their designees.     8.   I recognize that in the event my procedure results in extended X-Ray/fluoroscopy time, I may develop a skin reaction. 9. If I have a Do Not Attempt Resuscitation (DNAR) order in place, that status will be suspended while in the operating room, procedural suite, and during the recovery period unless otherwise explicitly stated by me (or a person authorized to consent on my behalf). The surgeon or my attending physician will determine when the applicable recovery period ends for purposes of reinstating the DNAR order. 10. Patients having a sterilization procedure: I understand that if the procedure is successful the results will be permanent and it will therefore be impossible for me to inseminate, conceive, or bear children. I also understand that the procedure is intended to result in sterility, although the result has not been guaranteed. 11. I acknowledge that my physician has explained sedation/analgesia administration to me including the risk and benefits I consent to the administration of sedation/analgesia as may be necessary or desirable in the judgment of my physician. I CERTIFY THAT I HAVE READ AND FULLY UNDERSTAND THE ABOVE CONSENT TO OPERATION and/or OTHER PROCEDURE.     _________________________________________ _________________________________     ___________________________________  Signature of Patient     Signature of Responsible Person                   Printed Name of Responsible Person                              _________________________________________ ______________________________        ___________________________________  Signature of Witness         Date  Time         Relationship to Patient    STATEMENT OF PHYSICIAN My signature below affirms that prior to the time of the procedure; I have explained to the patient and/or his/her legal representative, the risks and benefits involved in the proposed treatment and any reasonable alternative to the proposed treatment.  I have also explained the risks and benefits involved in refusal of the proposed treatment and alternatives to the proposed treatment and have answered the patient's questions.  If I have a significant financial interest in a co-management agreement or a significant financial interest in any product or implant, or other significant relationship used in this procedure/surgery, I have disclosed this and had a discussion with my patient.     _______________________________________________________________ _____________________________  (Signature of Physician)                                                                                         (Date)                                   (Time)  Patient Name: Waylon January    : 1970   Printed: 2023      Medical Record #: [id number]                                              Page 1 of 1

## (undated) NOTE — ED AVS SNAPSHOT
[facility] Emergency Department    [address] [address]    Phone:  [phone]    Fax:  [phone]           Didier Lamas   MRN: [id number]    Department:  [facility] Emergency Department   Date of Visit:  1/1 and Class Registration line at [phone] or find a doctor online by visiting [email or web address].    IF THERE IS ANY CHANGE OR WORSENING OF YOUR CONDITION, CALL YOUR PRIMARY CARE PHYSICIAN AT ONCE OR RETURN IMMEDIATELY TO [address] [place] [address].     If

## (undated) NOTE — ED AVS SNAPSHOT
Didier Lamas   MRN: [id number]    Department:  [facility] Emergency Department   Date of Visit:  9/14/2019           Disclosure     Insurance plans vary and the physician(s) referred by the ER may not be covered by your plan.  Please contact within the next three months to obtain basic health screening including reassessment of your blood pressure.     IF THERE IS ANY CHANGE OR WORSENING OF YOUR CONDITION, CALL YOUR PRIMARY CARE PHYSICIAN AT ONCE OR RETURN IMMEDIATELY TO THE EMERGENCY DEPARTMEN

## (undated) NOTE — ED AVS SNAPSHOT
Sabra Cowden   MRN: [id number]    Department:  [facility] Emergency Department   Date of Visit:  9/16/2017           Disclosure     Insurance plans vary and the physician(s) referred by the ER may not be covered by your plan.  Please contact CARE PHYSICIAN AT ONCE OR RETURN IMMEDIATELY TO THE EMERGENCY DEPARTMENT. If you have been prescribed any medication(s), please fill your prescription right away and begin taking the medication(s) as directed.   If you believe that any of the medications

## (undated) NOTE — IP AVS SNAPSHOT
[facility] [place]    [address], [facility], [facility] ~ [phone]                Discharge Summary   2/8/2017    Savita Pierre           Admission Information        Provider Department    2/8/2017 Gregory Murguia MD [facility] Pre-Op Cornelius predniSONE 10 MG Tabs   Commonly known as:  DELTASONE       RESTASIS 0.05 % Emul   Generic drug:  cycloSPORINE       SYMBICORT 160-4.5 MCG/ACT Aero   Generic drug:  Budesonide-Formoterol Fumarate       TraZODone HCl 100 MG Tabs   Commonly known as:  DESYR the next 24 hours. · To experience mild discomforts such as sore lip or tongue, headache, cramps, gas pains or a bloated feeling in your abdomen. · To experience mild back pain or soreness for a day or two if you had spinal or epidural anesthesia.    · Specialty:  SURGERY, ORTHOPEDIC    Why:  For suture removal, For wound re-check    Contact information:    [address] [place] [phone]        Future Appointments        Provider Department    2/21/2017 2:30 PM Rochelle Linares 0.18 (12/02/16)  4.00      Metabolic Lab Results  (Last result in the past 90 days)    HgbA1C Glucose BUN Creatinine Calcium Alkaline Phosph AST    -- (01/28/17)  112 (H) (01/28/17)  14 (01/28/17)  0.93 (01/28/17)  9.0 (12/02/16)  85 (12/02/16)  13 (L) Summaries. If you've been to the Emergency Department or your doctor's office, you can view your past visit information in Wedia by going to Visits < Visit Summaries. Wedia questions? Call [phone] for help.   Wedia is NOT to be used for urge

## (undated) NOTE — ED AVS SNAPSHOT
[facility] Emergency Department  [facility] [address]   [place] [address] [address]  Phone:  [phone]  Fax:  [phone] [facility] [place]ol[place]   MRN: [id number]    Department:  [facility] Emergency Department   Date of Visit:  7/1/2017 visiting [email or web address]    IF THERE IS ANY CHANGE OR WORSENING OF YOUR CONDITION, CALL YOUR PRIMARY CARE PHYSICIAN AT ONCE OR RETURN IMMEDIATELY TO THE EMERGENCY DEPARTMENT.     If you have been prescribed any medication(s), please fill your prescription